# Patient Record
Sex: MALE | Race: WHITE | NOT HISPANIC OR LATINO | Employment: FULL TIME | URBAN - METROPOLITAN AREA
[De-identification: names, ages, dates, MRNs, and addresses within clinical notes are randomized per-mention and may not be internally consistent; named-entity substitution may affect disease eponyms.]

---

## 2023-09-19 ENCOUNTER — OFFICE VISIT (OUTPATIENT)
Dept: FAMILY MEDICINE CLINIC | Facility: CLINIC | Age: 38
End: 2023-09-19
Payer: OTHER GOVERNMENT

## 2023-09-19 VITALS
HEART RATE: 73 BPM | HEIGHT: 74 IN | RESPIRATION RATE: 18 BRPM | BODY MASS INDEX: 31.88 KG/M2 | WEIGHT: 248.4 LBS | SYSTOLIC BLOOD PRESSURE: 138 MMHG | DIASTOLIC BLOOD PRESSURE: 70 MMHG | OXYGEN SATURATION: 97 % | TEMPERATURE: 98 F

## 2023-09-19 DIAGNOSIS — Z11.59 NEED FOR HEPATITIS C SCREENING TEST: ICD-10-CM

## 2023-09-19 DIAGNOSIS — F41.9 ANXIETY: ICD-10-CM

## 2023-09-19 DIAGNOSIS — Z00.00 ENCOUNTER FOR MEDICAL EXAMINATION TO ESTABLISH CARE: ICD-10-CM

## 2023-09-19 DIAGNOSIS — Z12.5 SCREENING PSA (PROSTATE SPECIFIC ANTIGEN): ICD-10-CM

## 2023-09-19 DIAGNOSIS — R03.0 ELEVATED BLOOD PRESSURE READING IN OFFICE WITHOUT DIAGNOSIS OF HYPERTENSION: ICD-10-CM

## 2023-09-19 DIAGNOSIS — R35.0 URINARY FREQUENCY: Primary | ICD-10-CM

## 2023-09-19 DIAGNOSIS — Z01.89 ENCOUNTER FOR ROUTINE CHEST X-RAY: ICD-10-CM

## 2023-09-19 PROBLEM — R80.9 PROTEINURIA: Status: RESOLVED | Noted: 2023-09-19 | Resolved: 2023-09-19

## 2023-09-19 PROBLEM — M54.9 CHRONIC BACK PAIN: Status: ACTIVE | Noted: 2023-09-19

## 2023-09-19 PROBLEM — Z23 NEED FOR VACCINATION: Status: RESOLVED | Noted: 2023-09-19 | Resolved: 2023-09-19

## 2023-09-19 PROBLEM — H31.002 CHORIORETINAL SCAR OF LEFT EYE: Status: RESOLVED | Noted: 2023-09-19 | Resolved: 2023-09-19

## 2023-09-19 PROBLEM — Z02.79 OTHER ISSUE OF MEDICAL CERTIFICATES: Status: RESOLVED | Noted: 2023-09-19 | Resolved: 2023-09-19

## 2023-09-19 PROBLEM — Z02.89 ENCOUNTER FOR OCCUPATIONAL HISTORY AND PHYSICAL EXAMINATION: Status: RESOLVED | Noted: 2023-09-19 | Resolved: 2023-09-19

## 2023-09-19 PROBLEM — Z71.89 OTHER SPECIFIED COUNSELING: Status: RESOLVED | Noted: 2023-09-19 | Resolved: 2023-09-19

## 2023-09-19 PROBLEM — Z71.89 COUNSELING ON SUBSTANCE USE AND ABUSE: Status: RESOLVED | Noted: 2023-09-19 | Resolved: 2023-09-19

## 2023-09-19 PROBLEM — H52.00 HYPEROPIA: Status: RESOLVED | Noted: 2023-09-19 | Resolved: 2023-09-19

## 2023-09-19 PROBLEM — Z02.89 HEALTH EXAMINATION OF DEFINED SUBPOPULATION: Status: RESOLVED | Noted: 2023-09-19 | Resolved: 2023-09-19

## 2023-09-19 PROBLEM — M25.552 PAIN OF LEFT HIP JOINT: Status: ACTIVE | Noted: 2023-09-19

## 2023-09-19 PROBLEM — G89.29 CHRONIC BACK PAIN: Status: ACTIVE | Noted: 2023-09-19

## 2023-09-19 PROBLEM — Z02.4 DRIVER'S PERMIT PHYSICAL EXAMINATION: Status: RESOLVED | Noted: 2023-09-19 | Resolved: 2023-09-19

## 2023-09-19 PROBLEM — Z71.3 ENCOUNTER FOR WEIGHT LOSS COUNSELING: Status: RESOLVED | Noted: 2023-09-19 | Resolved: 2023-09-19

## 2023-09-19 PROBLEM — Z02.9 ENCOUNTERS FOR ADMINISTRATIVE PURPOSE: Status: RESOLVED | Noted: 2023-09-19 | Resolved: 2023-09-19

## 2023-09-19 PROBLEM — Z01.10 OTHER EXAMINATION OF EARS AND HEARING: Status: RESOLVED | Noted: 2023-09-19 | Resolved: 2023-09-19

## 2023-09-19 PROBLEM — H17.9 CORNEAL SCAR: Status: RESOLVED | Noted: 2023-09-19 | Resolved: 2023-09-19

## 2023-09-19 PROBLEM — B02.9 HERPES ZOSTER: Status: RESOLVED | Noted: 2023-09-19 | Resolved: 2023-09-19

## 2023-09-19 PROCEDURE — 81003 URINALYSIS AUTO W/O SCOPE: CPT | Performed by: NURSE PRACTITIONER

## 2023-09-19 PROCEDURE — 84403 ASSAY OF TOTAL TESTOSTERONE: CPT | Performed by: NURSE PRACTITIONER

## 2023-09-19 PROCEDURE — 80053 COMPREHEN METABOLIC PANEL: CPT | Performed by: NURSE PRACTITIONER

## 2023-09-19 PROCEDURE — 85025 COMPLETE CBC W/AUTO DIFF WBC: CPT | Performed by: NURSE PRACTITIONER

## 2023-09-19 PROCEDURE — 84443 ASSAY THYROID STIM HORMONE: CPT | Performed by: NURSE PRACTITIONER

## 2023-09-19 PROCEDURE — 80061 LIPID PANEL: CPT | Performed by: NURSE PRACTITIONER

## 2023-09-19 PROCEDURE — 84439 ASSAY OF FREE THYROXINE: CPT | Performed by: NURSE PRACTITIONER

## 2023-09-19 PROCEDURE — G0103 PSA SCREENING: HCPCS | Performed by: NURSE PRACTITIONER

## 2023-09-19 PROCEDURE — 83036 HEMOGLOBIN GLYCOSYLATED A1C: CPT | Performed by: NURSE PRACTITIONER

## 2023-09-19 PROCEDURE — 86803 HEPATITIS C AB TEST: CPT | Performed by: NURSE PRACTITIONER

## 2023-09-19 RX ORDER — MULTIPLE VITAMINS W/ MINERALS TAB 9MG-400MCG
1 TAB ORAL DAILY
COMMUNITY

## 2023-09-19 NOTE — PROGRESS NOTES
"Chief Complaint  Establish Care and Difficulty Urinating (Onset 6-8 months. Feels like bladder doesn't empty, weaker stream, has to strain to go)    Subjective        Heron Pearson presents to [facility]  History of Present Illness    Patient presents today to establish care. Recently relocated to [facility]; reported serving in [employer] since 8/2004; planning nursing home soon. Currently in Master's program; exploring new future job opportunities.      Urinary symptoms:  Onset of symptoms began 6-8 months ago. Severity of symptoms are mild. Status is worsening. Frequency is intermittent. Presenting/initial symptoms include frequency-yes, hesitancy-yes, nocturia-yes 2-3x per night.  Drinks a lot of water throughout the day. No excess caffeine intake. Aggravated by nothing.  Relieved by nothing. Pertinent negatives include abdominal pain, dribbling, dysuria, fatigue, fever, flank pain,hematuria, nausea, cloudy urine, foul urine odor, pelvic pain, penile discharge, pressure, rash, retention, urgency, vomiting. Comments: Reported history of performance anxiety; still occurs occasionally; took Paxil in the past for short timeframe without help; no erectile problem; no depression; would like testosterone level checked today.         Objective   Vital Signs:  /70 (BP Location: Left arm, Patient Position: Sitting, Cuff Size: Large Adult)   Pulse 73   Temp 98 °F (36.7 °C) (Oral)   Resp 18   Ht 186.7 cm (73.5\")   Wt 113 kg (248 lb 6.4 oz)   SpO2 97% Comment: Room air  BMI 32.33 kg/m²   Estimated body mass index is 32.33 kg/m² as calculated from the following:    Height as of this encounter: 186.7 cm (73.5\").    Weight as of this encounter: 113 kg (248 lb 6.4 oz).             Physical Exam  Vitals and nursing note reviewed.   Constitutional:       Appearance: Normal appearance.   HENT:      Head: Normocephalic and atraumatic.      Right Ear: Tympanic membrane, ear canal and " external ear normal.      Left Ear: Tympanic membrane, ear canal and external ear normal.      Nose: Nose normal.      Mouth/Throat:      Pharynx: Oropharynx is clear.   Eyes:      Conjunctiva/sclera: Conjunctivae normal.   Cardiovascular:      Rate and Rhythm: Normal rate and regular rhythm.      Chest Wall: PMI is not displaced.      Pulses: Normal pulses.      Heart sounds: Normal heart sounds.   Pulmonary:      Effort: Pulmonary effort is normal.      Breath sounds: Normal breath sounds.   Abdominal:      General: Bowel sounds are normal.      Palpations: Abdomen is soft.      Tenderness: There is no abdominal tenderness.   Musculoskeletal:         General: Normal range of motion.      Cervical back: Neck supple.      Right lower leg: No edema.      Left lower leg: No edema.   Lymphadenopathy:      Cervical: No cervical adenopathy.      Right cervical: No superficial cervical adenopathy.     Left cervical: No superficial cervical adenopathy.   Skin:     General: Skin is warm and dry.   Neurological:      Mental Status: He is alert and oriented to person, place, and time.      Gait: Gait is intact.   Psychiatric:         Mood and Affect: Mood normal.         Behavior: Behavior normal.         Thought Content: Thought content normal.         Judgment: Judgment normal.      Result Review :                   Assessment and Plan   Diagnoses and all orders for this visit:    1. Urinary frequency (Primary)  Comments:  1. Labs today.   2. Follow up 8 weeks.  Orders:  -     Urinalysis With Culture If Indicated - Urine, Clean Catch  -     PSA Screen    2. Anxiety  Comments:  1. Lab today.  2. Follow up 8 weeks.  Orders:  -     Testosterone    3. Elevated blood pressure reading in office without diagnosis of hypertension  Comments:  Monitor. Check BP next visit.    4. Encounter for medical examination to establish care  -     Hemoglobin A1c  -     T4, Free  -     Urinalysis With Culture If Indicated - Urine, Clean Catch  -      Lipid Panel  -     Comprehensive Metabolic Panel  -     CBC & Differential  -     TSH    5. Encounter for routine chest x-ray  Comments:  Past smoker-10 years.  Orders:  -     XR Chest 2 View; Future    6. Need for hepatitis C screening test  -     Hepatitis C Antibody    7. Screening PSA (prostate specific antigen)  -     PSA Screen             Follow Up   Return in about 2 months (around 11/19/2023) for follow up urinary frequency/anxiety/BP check .  Patient was given instructions and counseling regarding his condition or for health maintenance advice. Please see specific information pulled into the AVS if appropriate.

## 2023-09-19 NOTE — PROGRESS NOTES
Venipuncture Blood Specimen Collection  Venipuncture performed in RA by Nan Randhawa MA with good hemostasis. Patient tolerated the procedure well without complications.   09/19/23   Nan Randhawa MA

## 2023-09-20 LAB
ALBUMIN SERPL-MCNC: 5.1 G/DL (ref 3.5–5.2)
ALBUMIN/GLOB SERPL: 2.2 G/DL
ALP SERPL-CCNC: 39 U/L (ref 39–117)
ALT SERPL W P-5'-P-CCNC: 33 U/L (ref 1–41)
ANION GAP SERPL CALCULATED.3IONS-SCNC: 8.9 MMOL/L (ref 5–15)
AST SERPL-CCNC: 30 U/L (ref 1–40)
BASOPHILS # BLD AUTO: 0.04 10*3/MM3 (ref 0–0.2)
BASOPHILS NFR BLD AUTO: 0.7 % (ref 0–1.5)
BILIRUB SERPL-MCNC: 0.6 MG/DL (ref 0–1.2)
BILIRUB UR QL STRIP: NEGATIVE
BUN SERPL-MCNC: 17 MG/DL (ref 6–20)
BUN/CREAT SERPL: 16.2 (ref 7–25)
CALCIUM SPEC-SCNC: 10.2 MG/DL (ref 8.6–10.5)
CHLORIDE SERPL-SCNC: 103 MMOL/L (ref 98–107)
CHOLEST SERPL-MCNC: 139 MG/DL (ref 0–200)
CLARITY UR: CLEAR
CO2 SERPL-SCNC: 28.1 MMOL/L (ref 22–29)
COLOR UR: YELLOW
CREAT SERPL-MCNC: 1.05 MG/DL (ref 0.76–1.27)
DEPRECATED RDW RBC AUTO: 40.4 FL (ref 37–54)
EGFRCR SERPLBLD CKD-EPI 2021: 92.6 ML/MIN/1.73
EOSINOPHIL # BLD AUTO: 0.06 10*3/MM3 (ref 0–0.4)
EOSINOPHIL NFR BLD AUTO: 1 % (ref 0.3–6.2)
ERYTHROCYTE [DISTWIDTH] IN BLOOD BY AUTOMATED COUNT: 12.5 % (ref 12.3–15.4)
GLOBULIN UR ELPH-MCNC: 2.3 GM/DL
GLUCOSE SERPL-MCNC: 97 MG/DL (ref 65–99)
GLUCOSE UR STRIP-MCNC: NEGATIVE MG/DL
HBA1C MFR BLD: 5.4 % (ref 4.8–5.6)
HCT VFR BLD AUTO: 45.1 % (ref 37.5–51)
HCV AB SER DONR QL: NORMAL
HDLC SERPL-MCNC: 61 MG/DL (ref 40–60)
HGB BLD-MCNC: 15.6 G/DL (ref 13–17.7)
HGB UR QL STRIP.AUTO: NEGATIVE
HOLD SPECIMEN: NORMAL
IMM GRANULOCYTES # BLD AUTO: 0.02 10*3/MM3 (ref 0–0.05)
IMM GRANULOCYTES NFR BLD AUTO: 0.3 % (ref 0–0.5)
KETONES UR QL STRIP: NEGATIVE
LDLC SERPL CALC-MCNC: 67 MG/DL (ref 0–100)
LDLC/HDLC SERPL: 1.12 {RATIO}
LEUKOCYTE ESTERASE UR QL STRIP.AUTO: NEGATIVE
LYMPHOCYTES # BLD AUTO: 1.57 10*3/MM3 (ref 0.7–3.1)
LYMPHOCYTES NFR BLD AUTO: 26.6 % (ref 19.6–45.3)
MCH RBC QN AUTO: 30.8 PG (ref 26.6–33)
MCHC RBC AUTO-ENTMCNC: 34.6 G/DL (ref 31.5–35.7)
MCV RBC AUTO: 89 FL (ref 79–97)
MONOCYTES # BLD AUTO: 0.66 10*3/MM3 (ref 0.1–0.9)
MONOCYTES NFR BLD AUTO: 11.2 % (ref 5–12)
NEUTROPHILS NFR BLD AUTO: 3.55 10*3/MM3 (ref 1.7–7)
NEUTROPHILS NFR BLD AUTO: 60.2 % (ref 42.7–76)
NITRITE UR QL STRIP: NEGATIVE
NRBC BLD AUTO-RTO: 0 /100 WBC (ref 0–0.2)
PH UR STRIP.AUTO: 6.5 [PH] (ref 5–8)
PLATELET # BLD AUTO: 244 10*3/MM3 (ref 140–450)
PMV BLD AUTO: 9.5 FL (ref 6–12)
POTASSIUM SERPL-SCNC: 4 MMOL/L (ref 3.5–5.2)
PROT SERPL-MCNC: 7.4 G/DL (ref 6–8.5)
PROT UR QL STRIP: NEGATIVE
PSA SERPL-MCNC: 0.89 NG/ML (ref 0–4)
RBC # BLD AUTO: 5.07 10*6/MM3 (ref 4.14–5.8)
SODIUM SERPL-SCNC: 140 MMOL/L (ref 136–145)
SP GR UR STRIP: 1.02 (ref 1–1.03)
T4 FREE SERPL-MCNC: 1.01 NG/DL (ref 0.93–1.7)
TESTOST SERPL-MCNC: 415 NG/DL (ref 249–836)
TRIGL SERPL-MCNC: 49 MG/DL (ref 0–150)
TSH SERPL DL<=0.05 MIU/L-ACNC: 3.53 UIU/ML (ref 0.27–4.2)
UROBILINOGEN UR QL STRIP: NORMAL
VLDLC SERPL-MCNC: 11 MG/DL (ref 5–40)
WBC NRBC COR # BLD: 5.9 10*3/MM3 (ref 3.4–10.8)

## 2023-11-21 ENCOUNTER — OFFICE VISIT (OUTPATIENT)
Dept: FAMILY MEDICINE CLINIC | Facility: CLINIC | Age: 38
End: 2023-11-21
Payer: OTHER GOVERNMENT

## 2023-11-21 VITALS
HEART RATE: 76 BPM | HEIGHT: 74 IN | DIASTOLIC BLOOD PRESSURE: 80 MMHG | WEIGHT: 249.8 LBS | TEMPERATURE: 98.2 F | SYSTOLIC BLOOD PRESSURE: 127 MMHG | BODY MASS INDEX: 32.06 KG/M2 | OXYGEN SATURATION: 97 % | RESPIRATION RATE: 16 BRPM

## 2023-11-21 DIAGNOSIS — F41.9 ANXIETY: ICD-10-CM

## 2023-11-21 DIAGNOSIS — R35.0 URINARY FREQUENCY: Primary | ICD-10-CM

## 2023-11-21 DIAGNOSIS — R03.0 ELEVATED BLOOD PRESSURE READING IN OFFICE WITHOUT DIAGNOSIS OF HYPERTENSION: ICD-10-CM

## 2023-11-21 DIAGNOSIS — M25.512 CHRONIC LEFT SHOULDER PAIN: ICD-10-CM

## 2023-11-21 DIAGNOSIS — G89.29 CHRONIC LEFT SHOULDER PAIN: ICD-10-CM

## 2023-11-21 PROBLEM — L72.3 SEBACEOUS CYST: Status: RESOLVED | Noted: 2023-11-21 | Resolved: 2023-11-21

## 2023-11-21 PROBLEM — F17.200 NICOTINE DEPENDENCE: Status: RESOLVED | Noted: 2023-11-21 | Resolved: 2023-11-21

## 2023-11-21 NOTE — PROGRESS NOTES
"Chief Complaint  Anxiety and Urinary Problems    Subjective        Heron Pearson presents to [facility]  History of Present Illness    Patient presents today for follow up on urinary frequency, anxiety, and elevated blood pressure reading (normal today).  Labs completed September 19, 2023 have been discussed today.  Urinary frequency and performance anxiety is improved 50 %. No longer drinking excessive fluids prior to bedtime.  Pertinent negatives include hesitancy, dribbling, retention, urgency, erectile problem, and lack of libido.    Left shoulder pain:   Onset of pain began 6 months ago. Severity of pain is mild/moderate. Frequency is intermittent. Status is no change. Location is left shoulder; anterior lateral. There is radiation to left bicep.  The quality of the pain is aching. There is no injury.  Context includes current  service; physical fitness training season. Aggravating factors include chin ups/pull ups.  There is associated decreased mobility and joint tenderness.  Pertinent negatives include crepitus, fever, bruising, difficulty initiating sleep, joint instability, locking, nocturnal awakening, numbness/weakness, popping, spasms, swelling, and tingling in arms.         Objective   Vital Signs:  /80 (BP Location: Left arm, Patient Position: Sitting, Cuff Size: Adult)   Pulse 76   Temp 98.2 °F (36.8 °C) (Infrared)   Resp 16   Ht 186.7 cm (73.5\")   Wt 113 kg (249 lb 12.8 oz)   SpO2 97%   BMI 32.51 kg/m²   Estimated body mass index is 32.51 kg/m² as calculated from the following:    Height as of this encounter: 186.7 cm (73.5\").    Weight as of this encounter: 113 kg (249 lb 12.8 oz).             Physical Exam  Constitutional:       General: He is not in acute distress.  Cardiovascular:      Rate and Rhythm: Normal rate and regular rhythm.      Chest Wall: PMI is not displaced.      Heart sounds: Normal heart sounds.   Pulmonary:      Effort: " Pulmonary effort is normal.      Breath sounds: Normal breath sounds and air entry.   Musculoskeletal:      Left shoulder: No swelling, tenderness, bony tenderness or crepitus. Decreased range of motion. Normal strength.      Left upper arm: No swelling or tenderness.      Comments: Left arm with normal adduction; abduction with verbalized left shoulder pain at 120-140 degrees    Skin:     General: Skin is warm and dry.   Neurological:      Mental Status: He is alert and oriented to person, place, and time.      Gait: Gait is intact.   Psychiatric:         Mood and Affect: Mood normal.         Behavior: Behavior normal.         Thought Content: Thought content normal.         Judgment: Judgment normal.        Result Review :    CMP          9/19/2023    09:59   CMP   Glucose 97    BUN 17    Creatinine 1.05    EGFR 92.6    Sodium 140    Potassium 4.0    Chloride 103    Calcium 10.2    Total Protein 7.4    Albumin 5.1    Globulin 2.3    Total Bilirubin 0.6    Alkaline Phosphatase 39    AST (SGOT) 30    ALT (SGPT) 33    Albumin/Globulin Ratio 2.2    BUN/Creatinine Ratio 16.2    Anion Gap 8.9      CBC w/diff          9/19/2023    09:59   CBC w/Diff   WBC 5.90    RBC 5.07    Hemoglobin 15.6    Hematocrit 45.1    MCV 89.0    MCH 30.8    MCHC 34.6    RDW 12.5    Platelets 244    Neutrophil Rel % 60.2    Immature Granulocyte Rel % 0.3    Lymphocyte Rel % 26.6    Monocyte Rel % 11.2    Eosinophil Rel % 1.0    Basophil Rel % 0.7      Lipid Panel          9/19/2023    09:59   Lipid Panel   Total Cholesterol 139    Triglycerides 49    HDL Cholesterol 61    VLDL Cholesterol 11    LDL Cholesterol  67    LDL/HDL Ratio 1.12      TSH          9/19/2023    09:59   TSH   TSH 3.530      Most Recent A1C          9/19/2023    09:59   HGBA1C Most Recent   Hemoglobin A1C 5.40      UA          9/19/2023    09:59   Urinalysis   Specific Gravity, UA 1.022    Ketones, UA Negative    Blood, UA Negative    Leukocytes, UA Negative    Nitrite, UA  Negative      PSA          9/19/2023    09:59   PSA   PSA 0.891        Testosterone, Total  249.00 - 836.00 ng/dL 415.00     PSA  0.000 - 4.000 ng/mL 0.891            Assessment and Plan   Diagnoses and all orders for this visit:    1. Urinary frequency (Primary)  Comments:  Symptoms improving.  UA and PSA normal.  Return if persists or worsens    2. Anxiety  Comments:  Discussed propranolol as needed.  Will consider.    3. Elevated blood pressure reading in office without diagnosis of hypertension  Comments:  Normal blood pressure today.  Continue to monitor.    4. Chronic left shoulder pain  -     Ambulatory Referral to Physical Therapy Evaluate and treat (left shoulder pain)           I spent 30 minutes caring for Heron on this date of service. This time includes time spent by me in the following activities:preparing for the visit, reviewing tests, performing a medically appropriate examination and/or evaluation , counseling and educating the patient/family/caregiver, referring and communicating with other health care professionals , and documenting information in the medical record  Follow Up   Return if symptoms worsen or fail to improve.  Patient was given instructions and counseling regarding his condition or for health maintenance advice. Please see specific information pulled into the AVS if appropriate.

## 2023-11-27 ENCOUNTER — TREATMENT (OUTPATIENT)
Dept: PHYSICAL THERAPY | Facility: CLINIC | Age: 38
End: 2023-11-27
Payer: OTHER GOVERNMENT

## 2023-11-27 DIAGNOSIS — M25.512 CHRONIC LEFT SHOULDER PAIN: Primary | ICD-10-CM

## 2023-11-27 DIAGNOSIS — G89.29 CHRONIC LEFT SHOULDER PAIN: Primary | ICD-10-CM

## 2023-11-27 DIAGNOSIS — M25.312 ROTATOR CUFF INSUFFICIENCY, LEFT: ICD-10-CM

## 2023-11-27 PROCEDURE — 97530 THERAPEUTIC ACTIVITIES: CPT | Performed by: PHYSICAL THERAPIST

## 2023-11-27 PROCEDURE — 97161 PT EVAL LOW COMPLEX 20 MIN: CPT | Performed by: PHYSICAL THERAPIST

## 2023-11-27 PROCEDURE — 97110 THERAPEUTIC EXERCISES: CPT | Performed by: PHYSICAL THERAPIST

## 2023-11-27 NOTE — PROGRESS NOTES
Physical Therapy Initial Evaluation and Plan of Care   [facility] [place]  [address], [address]  [place] [address]    Patient: Heron Pearson   : 1985  Diagnosis/ICD-10 Code:  Chronic left shoulder pain [M25.512, G89.29]  Referring practitioner: GERDA Mcmahan  Date of Initial Visit: 2023  Today's Date: 2023  Patient seen for 1 sessions        ICD-10-CM ICD-9-CM   1. Chronic left shoulder pain  M25.512 719.41    G89.29 338.29   2. Rotator cuff insufficiency, left  M25.312 726.10     Past Medical History:   Diagnosis Date    Body mass index 28.0-28.9, adult 2023    Chorioretinal scar of left eye 2023    Corneal scar 2023    Counseling on substance use and abuse 2023    Smoking Cessation    Counseling on substance use and abuse 2023    's permit physical examination 2023    Encounter for occupational history and physical examination 2023    F/U SCHEDULED FOR CYST REMOVAL.  PQ FOR DUTY.    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   He reports the pain first started in February of this year of insidious onset and has gotten progressively worse over time.    Pain  Current pain rating: 3  At best pain rating: 3  At worst pain ratin  Location: front and back of shoulder  Relieving factors: rest (motrin)  Exacerbated by: reaching up, out and behind the back, lying on it wrong, sleeping on it wrong.  Progression: worsening    Social Support  Lives in: one-story house  Lives with: spouse    Hand dominance: right    Treatments  Current treatment: physical therapy  Patient Goals  Patient goals for therapy: decreased pain, increased motion, independence with ADLs/IADLs and return to sport/leisure activities  Patient goal: return to workouts without pain         Objective          Postural Observations  Seated posture: good  Standing posture: good      Cervical/Thoracic Screen   Cervical range of motion within normal limits  Thoracic range of motion within normal limits    Active Range of Motion   Left Shoulder   Flexion: WFL and with pain  Abduction: WFL and with pain  External rotation 90°: 70 degrees with pain  Internal rotation 90°: WFL    Right Shoulder   Normal active range of motion    Scapular Mobility   Left Shoulder   Scapular mobility: WFL    Strength/Myotome Testing     Left Shoulder     Planes of Motion   Flexion: 5   Extension: 5   Abduction: 5   Adduction: 5   External rotation at 45°: 4   Internal rotation at 0°: 5     Isolated Muscles   Subscapularis: 5   Supraspinatus: 4+     Tests     Left Shoulder   Positive painful arc.   Negative belly press, empty can, full can, lift-off, Speed's and Yergason's.         Access Code: [email or web address]  URL: [email or web address]  Date: 2023  Prepared by: Theo Vazquez    Exercises  - Seated Shoulder Inferior Glide  - 1 x daily - 7 x weekly - 1 sets - 3 reps - 20 sec hold  - Standing Shoulder Posterior Capsule Stretch  - 1 x daily - 7 x weekly - 1 sets - 3 reps - 20 sec  hold  - Shoulder External Rotation with Anchored Resistance  - 1 x daily - 7 x weekly - 2 sets - 20 reps  - Shoulder Internal Rotation with Resistance  - 1 x daily - 7 x weekly - 2 sets - 20 reps      Assessment & Plan       Assessment  Impairments: abnormal or restricted ROM, lacks appropriate home exercise program and pain with function   Functional limitations: lifting, pulling, pushing, uncomfortable because of pain and reaching overhead   Assessment details: 38 year old male who presents with the impairments noted above secondary to left shoulder pain with overhead motion, painful arc and decreased strength of shoulder external rotators..  These impairments decrease his ability and tolerance to perform his normal daily activities.  This patient presents with a level of complexity and his condition is such that the services required can be safely and effectively performed only by a qualified PT or supervised PTA.     Prognosis: good    Goals  Plan Goals: STG (6 weeks)  1) Independent in home program for basic shoulder range of motion stretching  2) Demonstrates basic understanding of condition and their role in treatment progression  3) Tolerates initiation of shoulder strengthening  4) Demonstrates slight improvement in tolerance to daily activities as evidenced by QuickDash score of 10% or less.      LTG (12 Weeks)  1) Independent in home program for self-management of his condition  2) Demonstrates AROM  L shoulder flexion and abduction equal to 170 degrees or greater to allow for use of that upper extremity for reaching at or above head-height without pain  3) Demonstrates AROM L shoulder internal and external rotation at 90 degrees abduction equal to 80 degrees or greater to allow for use of that upper extremity for dressing and grooming  4) Demonstrates significant improvement in his tolerance to daily activities as evidenced by QuickDash score of 5% or less.  5) MMT of L shoulder flexion/abduction/IR/ER  equal to 5/5  to allow for ability to safely use that upper extremity for lifting, carrying and overhead work  6) Reports ability to return to working out without difficulty or significant shoulder pain.      Plan  Planned modality interventions: cryotherapy, ultrasound, thermotherapy (hydrocollator packs), TENS, high voltage pulsed current (spasm management), high voltage pulsed current (pain management) and microcurrent electrical stimulation  Planned therapy interventions: flexibility, functional ROM exercises, home exercise program, IADL retraining, joint mobilization, stretching, strengthening, therapeutic activities, soft tissue mobilization, neuromuscular re-education and manual therapy  Frequency: 2x week  Duration in weeks: 12  Treatment plan discussed with: patient        History # of Personal Factors and/or Comorbidities: LOW (0)  Examination of Body System(s): # of elements: LOW (1-2)  Clinical Presentation: STABLE   Clinical Decision Making: LOW     Timed:         Manual Therapy:         mins  10060;     Therapeutic Exercise:    15     mins  57921;     Neuromuscular Lady:        mins  36132;    Therapeutic Activity:     10     mins  45797;     Gait Training:           mins  03055;     Ultrasound:          mins  66999;    Ionto                                   mins   48375  Self Care                            mins   48878      Un-Timed:  Electrical Stimulation:         mins  72300 ( );  Canalith Repos         mins 02362  Traction          mins 85875  Dry Needle                 ______ mins DRYNDL  Low Eval     30     Mins  76822  Mod Eval          Mins  14444  High Eval                            Mins  63883  Re-Eval                               mins  71988        Timed Treatment:   25   mins   Total Treatment:     55  mins    PT SIGNATURE: Kwasi Vazquez, ZEUS   DATE TREATMENT INITIATED: 11/27/2023    Initial Certification  Certification Period: 11/27/2023 through 2/25/2024  I certify that the  therapy services are furnished while this patient is under my care.  The services outlined above are required by this patient, and will be reviewed every 90 days.     PHYSICIAN: Skyla Vega, APRN      DATE:     Please sign and return via fax to [phone]. Thank you, [facility].

## 2023-12-04 ENCOUNTER — TREATMENT (OUTPATIENT)
Dept: PHYSICAL THERAPY | Facility: CLINIC | Age: 38
End: 2023-12-04
Payer: OTHER GOVERNMENT

## 2023-12-04 DIAGNOSIS — G89.29 CHRONIC LEFT SHOULDER PAIN: Primary | ICD-10-CM

## 2023-12-04 DIAGNOSIS — M25.512 CHRONIC LEFT SHOULDER PAIN: Primary | ICD-10-CM

## 2023-12-04 DIAGNOSIS — M25.312 ROTATOR CUFF INSUFFICIENCY, LEFT: ICD-10-CM

## 2023-12-04 PROCEDURE — 97112 NEUROMUSCULAR REEDUCATION: CPT | Performed by: PHYSICAL THERAPIST

## 2023-12-04 PROCEDURE — 97110 THERAPEUTIC EXERCISES: CPT | Performed by: PHYSICAL THERAPIST

## 2023-12-04 PROCEDURE — 97530 THERAPEUTIC ACTIVITIES: CPT | Performed by: PHYSICAL THERAPIST

## 2023-12-04 NOTE — PROGRESS NOTES
Physical Therapy Treatment Note  [facility] [place]  [address]  [place] [address]    Patient: Heron Pearson   : 1985  Referring practitioner: GERDA Mcmahan  Date of Initial Visit: Type: THERAPY  Noted: 2023  Today's Date: 2023  Patient seen for 2 sessions    Diagnosis/ICD-10 Codes:       ICD-10-CM ICD-9-CM   1. Chronic left shoulder pain  M25.512 719.41    G89.29 338.29   2. Rotator cuff insufficiency, left  M25.312 726.10            Subjective     Heron Pearson reports: Doing well with home exercises from evaluation.  No significant changes in shoulder symptoms as yet.      Objective   See Exercise, Manual, and Modality Logs for complete treatment.     Issued, instructed in & performed home exercise program below:   Access Code: [email or web address]  URL: [email or web address]  Date: 2023  Prepared by: Theo Vazquez    Exercises  - Seated Shoulder Inferior Glide  - 1 x daily - 7 x weekly - 1 sets - 3 reps - 20 sec hold  - Standing Shoulder Posterior Capsule Stretch  - 1 x daily - 7 x weekly - 1 sets - 3 reps - 20 sec hold  - Sleeper Stretch  - 1 x daily - 7 x weekly - 1 sets - 10 reps - 10 sec hold  - Shoulder External Rotation with Anchored Resistance  - 1 x daily - 7 x weekly - 2 sets - 20 reps  - Shoulder Internal Rotation with Resistance  - 1 x daily - 7 x weekly - 2 sets - 20 reps  - Shoulder Flexion Wall Slide with Towel  - 1 x daily - 7 x weekly - 1 sets - 10 reps  - Shoulder Scaption Wall Slide with Towel  - 1 x daily - 7 x weekly - 1 sets - 10 reps  - Sidelying Shoulder ER with Towel and Dumbbell  - 1 x daily - 7 x weekly - 2 sets - 10 reps  - Prone Shoulder Extension on Swiss Ball  - 1 x daily - 7 x weekly - 2 sets - 10 reps  - Prone Shoulder Horizontal Abduction on Swiss Ball  - 1 x daily - 7 x weekly - 2 sets - 10 reps  - Prone Lower Trapezius Strengthening on Swiss Ball  - 1 x daily - 7 x weekly - 2 sets - 10 reps    Progressed / advanced exercises as  noted in flow sheets; advanced home program      Assessment/Plan  Good tolerance to interventions in clinic and doing well with home program thus far.    Progress strengthening /stabilization /functional activity as tolerated.  Assess response to to home exercise program. Assess response to today's interventions        Manual Therapy:         mins  98243;  Therapeutic Exercise:    20     mins  20869;     Neuromuscular Lady:    10    mins  57163;  Di  Therapeutic Activity:     10     mins  89427;     Gait Training:           mins  91539;     Ultrasound:          mins  98301;    Electrical Stimulation:         mins  30833 ( );  Dry Needling          mins self-pay    Timed Treatment:   40   mins   Total Treatment:     40   mins    Kwasi Vazquez PT  Physical Therapist

## 2023-12-12 ENCOUNTER — TREATMENT (OUTPATIENT)
Dept: PHYSICAL THERAPY | Facility: CLINIC | Age: 38
End: 2023-12-12
Payer: OTHER GOVERNMENT

## 2023-12-12 ENCOUNTER — OFFICE VISIT (OUTPATIENT)
Dept: FAMILY MEDICINE CLINIC | Facility: CLINIC | Age: 38
End: 2023-12-12
Payer: OTHER GOVERNMENT

## 2023-12-12 VITALS
WEIGHT: 249.6 LBS | HEIGHT: 74 IN | SYSTOLIC BLOOD PRESSURE: 138 MMHG | RESPIRATION RATE: 19 BRPM | BODY MASS INDEX: 32.03 KG/M2 | DIASTOLIC BLOOD PRESSURE: 82 MMHG | TEMPERATURE: 97.4 F | OXYGEN SATURATION: 98 % | HEART RATE: 74 BPM

## 2023-12-12 DIAGNOSIS — F41.9 ANXIETY: ICD-10-CM

## 2023-12-12 DIAGNOSIS — M25.512 CHRONIC LEFT SHOULDER PAIN: Primary | ICD-10-CM

## 2023-12-12 DIAGNOSIS — G89.29 CHRONIC LEFT SHOULDER PAIN: Primary | ICD-10-CM

## 2023-12-12 DIAGNOSIS — R03.0 ELEVATED BLOOD PRESSURE READING IN OFFICE WITHOUT DIAGNOSIS OF HYPERTENSION: Primary | ICD-10-CM

## 2023-12-12 DIAGNOSIS — M25.512 CHRONIC LEFT SHOULDER PAIN: ICD-10-CM

## 2023-12-12 DIAGNOSIS — M25.312 ROTATOR CUFF INSUFFICIENCY, LEFT: ICD-10-CM

## 2023-12-12 DIAGNOSIS — G89.29 CHRONIC LEFT SHOULDER PAIN: ICD-10-CM

## 2023-12-12 DIAGNOSIS — M67.922 DISORDER OF TENDON OF LEFT BICEPS: ICD-10-CM

## 2023-12-12 DIAGNOSIS — M25.561 ANTERIOR KNEE PAIN, RIGHT: ICD-10-CM

## 2023-12-12 PROCEDURE — 99214 OFFICE O/P EST MOD 30 MIN: CPT | Performed by: NURSE PRACTITIONER

## 2023-12-12 PROCEDURE — 97530 THERAPEUTIC ACTIVITIES: CPT | Performed by: PHYSICAL THERAPIST

## 2023-12-12 PROCEDURE — 97110 THERAPEUTIC EXERCISES: CPT | Performed by: PHYSICAL THERAPIST

## 2023-12-12 PROCEDURE — 97112 NEUROMUSCULAR REEDUCATION: CPT | Performed by: PHYSICAL THERAPIST

## 2023-12-12 RX ORDER — PROPRANOLOL HYDROCHLORIDE 10 MG/1
10 TABLET ORAL 3 TIMES DAILY
Qty: 90 TABLET | Refills: 0 | Status: SHIPPED | OUTPATIENT
Start: 2023-12-12

## 2023-12-12 NOTE — ASSESSMENT & PLAN NOTE
Discussed monitoring home blood pressure in left arm. Discussed BP goal < 130/80.  Follow-up in 3 months.

## 2023-12-12 NOTE — PROGRESS NOTES
"Chief Complaint  Elevated Blood Pressure and Anxiety    Subjective        Heron Pearson presents to [facility]  History of Present Illness    Patient presents today for follow-up on elevated blood pressure, follow-up anxiety, follow up left shoulder pain.      Elevated blood pressure: Status is fluctuating.  Blood pressure rechecked today; 138/82 manual cuff left arm.  Last visit /80.  There is associated intermittent anxiety.  Reported excessive caffeine intake; 2 cups of coffee, preworkout, and 2+ energy drinks per day. Negative for headache, chest pain, palpitations, dizziness, nausea and vomiting.    Anxiety: Reported anxiety is unchanged since last visit; occurs intermittently and is persistent.  No depression.      Left shoulder pain: Status is stable.  Currently receiving physical therapy services 1-2x/week with home exercises. Dx left rotator cuff insufficiency.  There is concern with associated ongoing pain with left bicep.        Right knee pain:  Onset of pain began July 2023. Severity of pain is mild. Frequency is intermittent. Status is no change. Location is anterior knee. There is no radiation. The quality of the pain is aching.  There is no injury. Aggravating factors include bending, climbing stairs, descending stairs. Relieving factors include ibuprofen. Pertinent negatives include crepitus, decreased mobility, joint tenderness, fever, bruising, difficulty initiating sleep, joint instability, limping, locking, nocturnal awakening, numbness/weakness, popping, spasms, swelling, and tingling in legs.       Objective   Vital Signs:  /82 (BP Location: Left arm, Patient Position: Sitting, Cuff Size: Adult)   Pulse 74   Temp 97.4 °F (36.3 °C) (Oral)   Resp 19   Ht 186.7 cm (73.5\")   Wt 113 kg (249 lb 9.6 oz)   SpO2 98%   BMI 32.48 kg/m²   Estimated body mass index is 32.48 kg/m² as calculated from the following:    Height as of this encounter: 186.7 cm " "(73.5\").    Weight as of this encounter: 113 kg (249 lb 9.6 oz).          Physical Exam  Constitutional:       General: He is not in acute distress.     Appearance: Normal appearance.   Cardiovascular:      Rate and Rhythm: Normal rate and regular rhythm.      Chest Wall: PMI is not displaced.      Heart sounds: Normal heart sounds.   Pulmonary:      Effort: Pulmonary effort is normal.      Breath sounds: Normal breath sounds and air entry.   Musculoskeletal:      Left shoulder: Decreased range of motion.      Left upper arm: No swelling.      Right knee: Normal. No swelling. Normal range of motion. Normal alignment.      Right lower leg: No edema.      Left lower leg: No edema.      Comments: Positive tenderness left bicep.     Skin:     General: Skin is warm and dry.   Neurological:      Mental Status: He is alert and oriented to person, place, and time.      Gait: Gait is intact.   Psychiatric:         Attention and Perception: Attention normal.         Mood and Affect: Mood normal.         Speech: Speech normal.         Behavior: Behavior normal.         Thought Content: Thought content normal.         Judgment: Judgment normal.        Result Review :                   Assessment and Plan   Diagnoses and all orders for this visit:    1. Elevated blood pressure reading in office without diagnosis of hypertension (Primary)  Assessment & Plan:  Discussed monitoring home blood pressure in left arm. Discussed BP goal < 130/80.  Follow-up in 3 months.      2. Chronic left shoulder pain  Assessment & Plan:  Continue PT.  Referral to orthopedics.     Orders:  -     Ambulatory Referral to Orthopedic Surgery    3. Disorder of tendon of left biceps  -     Ambulatory Referral to Orthopedic Surgery    4. Anxiety  Assessment & Plan:  Start propranolol TID as needed.   Follow up 3 months.     Orders:  -     propranolol (INDERAL) 10 MG tablet; Take 1 tablet by mouth 3 (Three) Times a Day. Update provider  Dispense: 90 tablet; " Refill: 0    5. Anterior knee pain, right  Comments:  Monitor. Return if symptoms persist or worsen.             Follow Up   Return in 3 months (on 3/12/2024) for follow up blood pressure/anxiety.  Patient was given instructions and counseling regarding his condition or for health maintenance advice. Please see specific information pulled into the AVS if appropriate.

## 2023-12-12 NOTE — PROGRESS NOTES
TPhysical Therapy Treatment Note  [facility] [place] [facility] [place]  [address], [address]  [place] [address]    Patient: Heron Pearson   : 1985  Referring practitioner: GERDA Mcmahan  Date of Initial Visit: Type: THERAPY  Noted: 2023  Today's Date: 2023  Patient seen for 3 sessions    Diagnosis/ICD-10 Codes:      ICD-10-CM ICD-9-CM   1. Chronic left shoulder pain  M25.512 719.41    G89.29 338.29   2. Rotator cuff insufficiency, left  M25.312 726.10              Subjective     Heron Pearson reports: Tolerating home program well but still having L shoulder pain.      Objective   See Exercise, Manual, and Modality Logs for complete treatment.     Progressed / advanced exercises as noted in flow sheets; included more closed kinetic chain work and RC strengthening and stabilization.    Reviewed & performed home exercise program below:   Access Code: [email or web address]  URL: [email or web address]  Date: 2023  Prepared by: Theo Vazquez    Exercises  - Seated Shoulder Inferior Glide  - 1 x daily - 7 x weekly - 1 sets - 3 reps - 20 sec hold  - Standing Shoulder Posterior Capsule Stretch  - 1 x daily - 7 x weekly - 1 sets - 3 reps - 20 sec hold  - Sleeper Stretch  - 1 x daily - 7 x weekly - 1 sets - 10 reps - 10 sec hold  - Shoulder External Rotation with Anchored Resistance  - 1 x daily - 7 x weekly - 2 sets - 20 reps  - Shoulder Internal Rotation with Resistance  - 1 x daily - 7 x weekly - 2 sets - 20 reps  - Shoulder Flexion Wall Slide with Towel  - 1 x daily - 7 x weekly - 1 sets - 10 reps  - Shoulder Scaption Wall Slide with Towel  - 1 x daily - 7 x weekly - 1 sets - 10 reps  - Sidelying Shoulder ER with Towel and Dumbbell  - 1 x daily - 7 x weekly - 2 sets - 10 reps  - Prone Shoulder Extension on Swiss Ball  - 1 x daily - 7 x weekly - 2 sets - 10 reps  - Prone Shoulder Horizontal Abduction on Swiss Ball  - 1 x daily - 7 x weekly - 2 sets - 10 reps  - Prone Lower Trapezius Strengthening on  Swiss Ball  - 1 x daily - 7 x weekly - 2 sets - 10 reps    Assessment/Plan  Doing well with tolerance to interventions thought no significant changes in pain levels as yet.    Progress strengthening /stabilization /functional activity as tolerated.  Assess response to today's interventions            Manual Therapy:         mins  20567;  Therapeutic Exercise:    20     mins  03718;     Neuromuscular Lady:    10    mins  22064;    Therapeutic Activity:     10     mins  89626;     Gait Training:           mins  68524;     Ultrasound:          mins  18137;    Electrical Stimulation:         mins  11448 ( );  Dry Needling          mins self-pay    Timed Treatment:   40   mins   Total Treatment:     40   mins    Kwasi Vazquez PT  Physical Therapist

## 2023-12-15 ENCOUNTER — TREATMENT (OUTPATIENT)
Dept: PHYSICAL THERAPY | Facility: CLINIC | Age: 38
End: 2023-12-15
Payer: OTHER GOVERNMENT

## 2023-12-15 DIAGNOSIS — M25.312 ROTATOR CUFF INSUFFICIENCY, LEFT: ICD-10-CM

## 2023-12-15 DIAGNOSIS — G89.29 CHRONIC LEFT SHOULDER PAIN: Primary | ICD-10-CM

## 2023-12-15 DIAGNOSIS — M25.512 CHRONIC LEFT SHOULDER PAIN: Primary | ICD-10-CM

## 2023-12-15 PROCEDURE — 97110 THERAPEUTIC EXERCISES: CPT | Performed by: PHYSICAL THERAPIST

## 2023-12-15 PROCEDURE — 97112 NEUROMUSCULAR REEDUCATION: CPT | Performed by: PHYSICAL THERAPIST

## 2023-12-15 PROCEDURE — 97530 THERAPEUTIC ACTIVITIES: CPT | Performed by: PHYSICAL THERAPIST

## 2023-12-18 ENCOUNTER — TREATMENT (OUTPATIENT)
Dept: PHYSICAL THERAPY | Facility: CLINIC | Age: 38
End: 2023-12-18
Payer: OTHER GOVERNMENT

## 2023-12-18 DIAGNOSIS — G89.29 CHRONIC LEFT SHOULDER PAIN: Primary | ICD-10-CM

## 2023-12-18 DIAGNOSIS — M25.512 CHRONIC LEFT SHOULDER PAIN: Primary | ICD-10-CM

## 2023-12-18 DIAGNOSIS — M25.312 ROTATOR CUFF INSUFFICIENCY, LEFT: ICD-10-CM

## 2023-12-18 PROCEDURE — 97530 THERAPEUTIC ACTIVITIES: CPT | Performed by: PHYSICAL THERAPIST

## 2023-12-18 PROCEDURE — 97110 THERAPEUTIC EXERCISES: CPT | Performed by: PHYSICAL THERAPIST

## 2023-12-18 PROCEDURE — 97112 NEUROMUSCULAR REEDUCATION: CPT | Performed by: PHYSICAL THERAPIST

## 2023-12-18 NOTE — PROGRESS NOTES
TPhysical Therapy Treatment Note  [facility] [place]  [address], [address]  [place] [address]    Patient: Heron Pearson   : 1985  Referring practitioner: GERDA Mcmahan  Date of Initial Visit: Type: THERAPY  Noted: 2023  Today's Date: 2023  Patient seen for 5 sessions    Diagnosis/ICD-10 Codes:      ICD-10-CM ICD-9-CM   1. Chronic left shoulder pain  M25.512 719.41    G89.29 338.29   2. Rotator cuff insufficiency, left  M25.312 726.10              Subjective     Heron Pearson reports: Tolerating home program and progression of interventions in clinic well, but still having shoulder pain.      Objective   See Exercise, Manual, and Modality Logs for complete treatment.     Progressed / advanced exercises as noted in flow sheets; included more closed kinetic chain work and RC strengthening and stabilization.    Issued, instructed in & performed home exercise program below:   Access Code: [email or web address]  URL: [email or web address]  Date: 2023  Prepared by: Theo Vazquez    Exercises  - Seated Shoulder Inferior Glide  - 1 x daily - 7 x weekly - 1 sets - 3 reps - 20 sec hold  - Standing Shoulder Posterior Capsule Stretch  - 1 x daily - 7 x weekly - 1 sets - 3 reps - 20 sec hold  - Sleeper Stretch  - 1 x daily - 7 x weekly - 1 sets - 10 reps - 10 sec hold  - Shoulder External Rotation with Anchored Resistance  - 1 x daily - 7 x weekly - 2 sets - 20 reps  - Shoulder Internal Rotation with Resistance  - 1 x daily - 7 x weekly - 2 sets - 20 reps  - Shoulder Flexion Wall Slide with Towel  - 1 x daily - 7 x weekly - 1 sets - 10 reps  - Shoulder Scaption Wall Slide with Towel  - 1 x daily - 7 x weekly - 1 sets - 10 reps  - Sidelying Shoulder ER with Towel and Dumbbell  - 1 x daily - 7 x weekly - 2 sets - 10 reps  - Prone Shoulder Extension on Swiss Ball  - 1 x daily - 7 x weekly - 2 sets - 10 reps  - Prone Shoulder Horizontal Abduction on Swiss Ball  - 1 x daily - 7 x weekly - 2 sets  - 10 reps  - Prone Lower Trapezius Strengthening on Swiss Ball  - 1 x daily - 7 x weekly - 2 sets - 10 reps  - Standing Single Arm Shoulder PNF D1 Flexion with Anchored Resistance  - 1 x daily - 7 x weekly - 2 sets - 10 reps  - Shoulder PNF D2 with Resistance  - 1 x daily - 7 x weekly - 2 sets - 10 reps    Assessment/Plan  Doing well with tolerance to interventions though no significant changes in pain levels as yet.    Progress strengthening /stabilization /functional activity as tolerated.  Assess response to today's interventions and advancement of home program.            Manual Therapy:         mins  89589;  Therapeutic Exercise:    20     mins  28782;     Neuromuscular Lady:    10    mins  40779;    Therapeutic Activity:     10     mins  46873;     Gait Training:           mins  19899;     Ultrasound:          mins  52130;    Electrical Stimulation:         mins  21592 ( );  Dry Needling          mins self-pay    Timed Treatment:   40   mins   Total Treatment:     40   mins    Kwasi Vazquez PT  Physical Therapist

## 2023-12-22 ENCOUNTER — TREATMENT (OUTPATIENT)
Dept: PHYSICAL THERAPY | Facility: CLINIC | Age: 38
End: 2023-12-22
Payer: OTHER GOVERNMENT

## 2023-12-22 DIAGNOSIS — M25.512 CHRONIC LEFT SHOULDER PAIN: Primary | ICD-10-CM

## 2023-12-22 DIAGNOSIS — M25.312 ROTATOR CUFF INSUFFICIENCY, LEFT: ICD-10-CM

## 2023-12-22 DIAGNOSIS — G89.29 CHRONIC LEFT SHOULDER PAIN: Primary | ICD-10-CM

## 2023-12-22 PROCEDURE — 97530 THERAPEUTIC ACTIVITIES: CPT | Performed by: PHYSICAL THERAPIST

## 2023-12-22 PROCEDURE — 97110 THERAPEUTIC EXERCISES: CPT | Performed by: PHYSICAL THERAPIST

## 2023-12-22 PROCEDURE — 97112 NEUROMUSCULAR REEDUCATION: CPT | Performed by: PHYSICAL THERAPIST

## 2023-12-22 NOTE — PROGRESS NOTES
TPhysical Therapy Treatment Note  [facility] [place]  [address], [address]  [place] [address]    Patient: Heron Pearson   : 1985  Referring practitioner: GERDA Mcmahan  Date of Initial Visit: Type: THERAPY  Noted: 2023  Today's Date: 2023  Patient seen for 6 sessions    Diagnosis/ICD-10 Codes:      ICD-10-CM ICD-9-CM   1. Chronic left shoulder pain  M25.512 719.41    G89.29 338.29   2. Rotator cuff insufficiency, left  M25.312 726.10              Subjective     Heron Pearson reports: Still having shoulder pain.   Noted that D1 flexion test caused increased pain.      Objective   See Exercise, Manual, and Modality Logs for complete treatment.     Progressed / advanced exercises as noted in flow sheets; included more closed kinetic chain work and RC strengthening and stabilization.    Reviewed & performed home exercise program below:   Access Code: [email or web address]  URL: [email or web address]  Date: 2023  Prepared by: Theo Vazquez    Exercises  - Seated Shoulder Inferior Glide  - 1 x daily - 7 x weekly - 1 sets - 3 reps - 20 sec hold  - Standing Shoulder Posterior Capsule Stretch  - 1 x daily - 7 x weekly - 1 sets - 3 reps - 20 sec hold  - Sleeper Stretch  - 1 x daily - 7 x weekly - 1 sets - 10 reps - 10 sec hold  - Shoulder External Rotation with Anchored Resistance  - 1 x daily - 7 x weekly - 2 sets - 20 reps  - Shoulder Internal Rotation with Resistance  - 1 x daily - 7 x weekly - 2 sets - 20 reps  - Shoulder Flexion Wall Slide with Towel  - 1 x daily - 7 x weekly - 1 sets - 10 reps  - Shoulder Scaption Wall Slide with Towel  - 1 x daily - 7 x weekly - 1 sets - 10 reps  - Sidelying Shoulder ER with Towel and Dumbbell  - 1 x daily - 7 x weekly - 2 sets - 10 reps  - Prone Shoulder Extension on Swiss Ball  - 1 x daily - 7 x weekly - 2 sets - 10 reps  - Prone Shoulder Horizontal Abduction on Swiss Ball  - 1 x daily - 7 x weekly - 2 sets - 10 reps  - Prone Lower Trapezius  Strengthening on Swiss Ball  - 1 x daily - 7 x weekly - 2 sets - 10 reps  - Standing Single Arm Shoulder PNF D1 Flexion with Anchored Resistance  - 1 x daily - 7 x weekly - 2 sets - 10 reps  - Shoulder PNF D2 with Resistance  - 1 x daily - 7 x weekly - 2 sets - 10 reps    Re-examined shoulder and noted to have pain with AC shear test.  Patient instructed to hold:  - post capsule stretch (cross body)  - d1 flexion resisted      Assessment/Plan  Doing well with tolerance to interventions thought no significant changes in pain levels as yet.  Pain with D1 flexion and cross-body stretch, may have AC joint involvement.  Appt scheduled with ortho on 1/4/2023.    Progress strengthening /stabilization /functional activity as tolerated.  Assess response to today's interventions            Manual Therapy:         mins  93316;  Therapeutic Exercise:    20     mins  83587;     Neuromuscular Lady:    10    mins  35407;    Therapeutic Activity:     10     mins  50641;     Gait Training:           mins  43789;     Ultrasound:          mins  90503;    Electrical Stimulation:         mins  50517 ( );  Dry Needling          mins self-pay    Timed Treatment:   40   mins   Total Treatment:     40   mins    Kwasi Vazquez PT  Physical Therapist

## 2023-12-26 NOTE — PROGRESS NOTES
TPhysical Therapy Treatment Note  [facility] [place]  [address], [address]  [place] [address]    Patient: Heron Pearson   : 1985  Referring practitioner: GERDA Mcmahan  Date of Initial Visit: Type: THERAPY  Noted: 2023  Today's Date: 2023  Patient seen for 4 sessions    Diagnosis/ICD-10 Codes:      ICD-10-CM ICD-9-CM   1. Chronic left shoulder pain  M25.512 719.41    G89.29 338.29   2. Rotator cuff insufficiency, left  M25.312 726.10              Subjective     Heron Pearson reports: Tolerating home program well but still having L shoulder pain.  No much of appreciable change      Objective   See Exercise, Manual, and Modality Logs for complete treatment.     Progressed / advanced exercises as noted in flow sheets; included more closed kinetic chain work and RC strengthening and stabilization.    Reviewed & performed home exercise program below:   Access Code: [email or web address]  URL: [email or web address]  Date: 2023  Prepared by: Theo Vazquez    Exercises  - Seated Shoulder Inferior Glide  - 1 x daily - 7 x weekly - 1 sets - 3 reps - 20 sec hold  - Standing Shoulder Posterior Capsule Stretch  - 1 x daily - 7 x weekly - 1 sets - 3 reps - 20 sec hold  - Sleeper Stretch  - 1 x daily - 7 x weekly - 1 sets - 10 reps - 10 sec hold  - Shoulder External Rotation with Anchored Resistance  - 1 x daily - 7 x weekly - 2 sets - 20 reps  - Shoulder Internal Rotation with Resistance  - 1 x daily - 7 x weekly - 2 sets - 20 reps  - Shoulder Flexion Wall Slide with Towel  - 1 x daily - 7 x weekly - 1 sets - 10 reps  - Shoulder Scaption Wall Slide with Towel  - 1 x daily - 7 x weekly - 1 sets - 10 reps  - Sidelying Shoulder ER with Towel and Dumbbell  - 1 x daily - 7 x weekly - 2 sets - 10 reps  - Prone Shoulder Extension on Swiss Ball  - 1 x daily - 7 x weekly - 2 sets - 10 reps  - Prone Shoulder Horizontal Abduction on Swiss Ball  - 1 x daily - 7 x weekly - 2 sets - 10 reps  - Prone  Lower Trapezius Strengthening on Swiss Ball  - 1 x daily - 7 x weekly - 2 sets - 10 reps    Assessment/Plan  Doing well with tolerance to interventions thought no significant changes in pain levels as yet.    Progress strengthening /stabilization /functional activity as tolerated.  Assess response to today's interventions            Manual Therapy:         mins  39053;  Therapeutic Exercise:    20     mins  52561;     Neuromuscular Lady:    10    mins  55910;    Therapeutic Activity:     10     mins  07699;     Gait Training:           mins  76129;     Ultrasound:          mins  39211;    Electrical Stimulation:         mins  34258 ( );  Dry Needling          mins self-pay    Timed Treatment:   40   mins   Total Treatment:     40   mins    Kwasi Vazquez, PT  Physical Therapist

## 2023-12-29 ENCOUNTER — TREATMENT (OUTPATIENT)
Dept: PHYSICAL THERAPY | Facility: CLINIC | Age: 38
End: 2023-12-29
Payer: OTHER GOVERNMENT

## 2023-12-29 DIAGNOSIS — M25.312 ROTATOR CUFF INSUFFICIENCY, LEFT: ICD-10-CM

## 2023-12-29 DIAGNOSIS — G89.29 CHRONIC LEFT SHOULDER PAIN: Primary | ICD-10-CM

## 2023-12-29 DIAGNOSIS — M25.512 CHRONIC LEFT SHOULDER PAIN: Primary | ICD-10-CM

## 2023-12-29 PROCEDURE — 97110 THERAPEUTIC EXERCISES: CPT | Performed by: PHYSICAL THERAPIST

## 2023-12-29 PROCEDURE — 97530 THERAPEUTIC ACTIVITIES: CPT | Performed by: PHYSICAL THERAPIST

## 2023-12-29 NOTE — LETTER
Re-Assessment / Progress Note  [facility] [place] [place]  [address]  [place] [address]  Patient: Heron Pearson   : 1985  Diagnosis/ICD-10 Code:  Chronic left shoulder pain [M25.512, G89.29]  Referring practitioner: GERDA Mcmahan  Date of Initial Visit: Episode Type: THERAPY  Noted: 2023  No   Today's Date: 2023  Patient seen for 7 sessions.      Subjective:     Subjective Questionnaire: QuickDASH: 22% (22% at initial eval)  Subjective Questionnaire: QuickDASH Work (Symform - admin) - 0% (25% at initial eval)  Subjective Questionnaire: QuickDASH Sports ( Physical Training/Running/Working Out): 37% (50% at initial eval)  Clinical Progress: unchanged  Home Program Compliance: Yes  Treatment has included: therapeutic exercise, neuromuscular re-education, manual therapy, and therapeutic activity    Subjective     Heron Pearson reports: Not much overall improvement.  Still with pain at top/front of shoulder.  Tolerated most of exercises well, but pain with D1 flexion and some mild pain with cross-body stretch.    Pain  Current pain rating: 3  At best pain rating: 3  At worst pain ratin  Location: front and back of shoulder  Relieving factors: rest (motrin)  Exacerbated by: reaching up, out and behind the back, lying on it wrong, sleeping on it wrong.  Progression: no change    Objective     Postural Observations  Seated posture: good  Standing posture: good        Cervical/Thoracic Screen   Cervical range of motion within normal limits  Thoracic range of motion within normal limits     Active Range of Motion   Left Shoulder   Flexion: WFL and with pain  Abduction: WFL and with pain  External rotation 90°: 90 degrees without pain  Internal rotation 90°: WFL     Right Shoulder   Normal active range of motion     Scapular Mobility   Left Shoulder   Scapular mobility: WFL     Strength/Myotome Testing      Left Shoulder      Planes of Motion   Flexion: 5   Extension: 5    Abduction: 5   Adduction: 5   External rotation at 45°: 4+  Internal rotation at 0°: 5      Isolated Muscles   Subscapularis: 5   Supraspinatus: 4+      Tests      Left Shoulder   Positive painful arc.   Negative belly press, empty can, full can, lift-off, Speed's and Yergason'  Mild pain with AC sheer test    Assessment/Plan    Heron Pearson continues to have superior/anterior shoulder pain.  His range of motion has improved, but his pain has not diminished.  He does tolerate advance strengthening well with exception of D1 flexion resisted strengthening and some mild pain with cross-body stretch (post. Capsule).  Will hold pending orthopedic consult.  Please advise on continuing.  If OK to continue, see parameters below.      Progress toward previous goals: Partially Met    Goals    Short-term goals (STG): 3/4  Long-term goals (LTG): 2/6      STG (6 weeks)  1) Independent in home program for basic shoulder range of motion stretching (MET)   2) Demonstrates basic understanding of condition and their role in treatment progression (MET)   3) Tolerates initiation of shoulder strengthening (MET)   4) Demonstrates slight improvement in tolerance to daily activities as evidenced by QuickDash score of 10% or less. (NOT MET)         LTG (12 Weeks)  1) Independent in home program for self-management of his condition (NOT MET)   2) Demonstrates AROM  L shoulder flexion and abduction equal to 170 degrees or greater to allow for use of that upper extremity for reaching at or above head-height without pain (MET)   3) Demonstrates AROM L shoulder internal and external rotation at 90 degrees abduction equal to 80 degrees or greater to allow for use of that upper extremity for dressing and grooming (MET)   4) Demonstrates significant improvement in his tolerance to daily activities as evidenced by QuickDash score of 5% or less. (NOT MET)   5) MMT of L shoulder flexion/abduction/IR/ER equal to 5/5  to allow for ability to safely  use that upper extremity for lifting, carrying and overhead work (NOT MET)   6) Reports ability to return to working out without difficulty or significant shoulder pain. (NOT MET)         Recommendations: Continue as planned pending orthopedic consult.  Frequency;  2 x per week  Timeframe: 8 weeks  Prognosis to achieve goals: good    PT Signature: Kwasi Vazquez PT    Thank you for allowing us to care for your patient!

## 2023-12-29 NOTE — PROGRESS NOTES
Re-Assessment / Progress Note  [facility] [place]  [address]  [place] [address]  Patient: Heron Pearson   : 1985  Diagnosis/ICD-10 Code:  Chronic left shoulder pain [M25.512, G89.29]  Referring practitioner: GERDA Mcmahan  Date of Initial Visit: Episode Type: THERAPY  Noted: 2023  No   Today's Date: 2023  Patient seen for 7 sessions.      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  Still with pain at top/front of shoulder.  Tolerated most of exercises well, but pain with D1 flexion and some mild pain with cross-body stretch.    Pain  Current pain rating: 3  At best pain rating: 3  At worst pain ratin  Location: front and back of shoulder  Relieving factors: rest (motrin)  Exacerbated by: reaching up, out and behind the back, lying on it wrong, sleeping on it wrong.  Progression: no change    Objective     Postural Observations  Seated posture: good  Standing posture: good        Cervical/Thoracic Screen   Cervical range of motion within normal limits  Thoracic range of motion within normal limits     Active Range of Motion   Left Shoulder   Flexion: WFL and with pain  Abduction: WFL and with pain  External rotation 90°: 90 degrees without pain  Internal rotation 90°: WFL     Right Shoulder   Normal active range of motion     Scapular Mobility   Left Shoulder   Scapular mobility: WFL     Strength/Myotome Testing      Left Shoulder      Planes of Motion   Flexion: 5   Extension: 5    Abduction: 5   Adduction: 5   External rotation at 45°: 4+  Internal rotation at 0°: 5      Isolated Muscles   Subscapularis: 5   Supraspinatus: 4+      Tests      Left Shoulder   Positive painful arc.   Negative belly press, empty can, full can, lift-off, Speed's and Yergason'  Mild pain with AC sheer test    Assessment/Plan    Heron Pearson continues to have superior/anterior shoulder pain.  His range of motion has improved, but his pain has not diminished.  He does tolerate advance strengthening well with exception of D1 flexion resisted strengthening and some mild pain with cross-body stretch (post. Capsule).  Will hold pending orthopedic consult.  Please advise on continuing.  If OK to continue, see parameters below.      Progress toward previous goals: Partially Met    Goals    Short-term goals (STG): 3/4  Long-term goals (LTG): 2/6      STG (6 weeks)  1) Independent in home program for basic shoulder range of motion stretching (MET)   2) Demonstrates basic understanding of condition and their role in treatment progression (MET)   3) Tolerates initiation of shoulder strengthening (MET)   4) Demonstrates slight improvement in tolerance to daily activities as evidenced by QuickDash score of 10% or less. (NOT MET)         LTG (12 Weeks)  1) Independent in home program for self-management of his condition (NOT MET)   2) Demonstrates AROM  L shoulder flexion and abduction equal to 170 degrees or greater to allow for use of that upper extremity for reaching at or above head-height without pain (MET)   3) Demonstrates AROM L shoulder internal and external rotation at 90 degrees abduction equal to 80 degrees or greater to allow for use of that upper extremity for dressing and grooming (MET)   4) Demonstrates significant improvement in his tolerance to daily activities as evidenced by QuickDash score of 5% or less. (NOT MET)   5) MMT of L shoulder flexion/abduction/IR/ER equal to 5/5  to allow for ability to safely  use that upper extremity for lifting, carrying and overhead work (NOT MET)   6) Reports ability to return to working out without difficulty or significant shoulder pain. (NOT MET)         Recommendations: Continue as planned pending orthopedic consult.  Frequency;  2 x per week  Timeframe: 8 weeks  Prognosis to achieve goals: good    PT Signature: Kwasi Vazquez PT    Manual Therapy:                 mins  38873;  Therapeutic Exercise:    20     mins  02167;     Neuromuscular Lady:        mins  47097;    Therapeutic Activity:      10     mins  68501;     Gait Training:                      mins  11163;     Ultrasound:                          mins  25916;    Electrical Stimulation:         mins  92590 ( );  Dry Needling                       mins self-pay     Timed Treatment:   30  mins   Total Treatment:     30   mins

## 2024-01-02 DIAGNOSIS — Z11.4 ENCOUNTER FOR SCREENING FOR HIV: Primary | ICD-10-CM

## 2024-01-04 ENCOUNTER — OFFICE VISIT (OUTPATIENT)
Dept: ORTHOPEDIC SURGERY | Facility: CLINIC | Age: 39
End: 2024-01-04
Payer: OTHER GOVERNMENT

## 2024-01-04 VITALS — OXYGEN SATURATION: 99 % | WEIGHT: 249 LBS | HEART RATE: 74 BPM | HEIGHT: 74 IN | BODY MASS INDEX: 31.95 KG/M2

## 2024-01-04 DIAGNOSIS — M25.512 CHRONIC LEFT SHOULDER PAIN: Primary | ICD-10-CM

## 2024-01-04 DIAGNOSIS — G89.29 CHRONIC LEFT SHOULDER PAIN: Primary | ICD-10-CM

## 2024-01-04 NOTE — PROGRESS NOTES
"Heron is a 38 y.o. year old male presents to [facility]    Chief Complaint   Patient presents with    Left Shoulder - Pain, Initial Evaluation    Left Elbow - Pain, Initial Evaluation       History of Present Illness  Heron Pearson is a 38 y.o. male [employer] presents to clinic for evaluation of left shoulder/bicep pain that has been present since an incident in flag football in 2019, but worsening over the past 6 months. Qualifies the pain as dull ache, jolting/sharp quick pain with exacerbation.   Provocative factors: cleaning, bicep curl, chin ups,     Past treatments: greater than 4 weeks of physical therapy, ibuprofen, rest, activity modification, ice baths,       I have reviewed the patient's medical, family, and social history in detail and updated the computerized patient record.    Objective:  Pulse 74   Ht 186.7 cm (73.5\")   Wt 113 kg (249 lb)   SpO2 99%   BMI 32.41 kg/m²      Physical Exam    Vital signs reviewed.   General: No acute distress.  Eyes: conjunctiva clear  ENT: external ears atraumatic  CV: no peripheral edema  Resp: normal respiratory effort  Skin: no rashes or wounds; normal turgor  Psych: mood and affect appropriate; recent and remote memory intact  Neuro: sensation to light touch intact    MSK Exam  Left Shoulder:    No deformity or wounds, no redness or swelling appreciated.  Tenderness to palpation over the anterior joint line and bicipital groove  Painful arc forward flexion of 80- 120  Patient has full, AROM with forward flexion, external rotation and internal rotation.  No pain nor weakness with [place] and supraspinatus testing  Negative [place]; positive scarf  Negative Neer    Positive speed  Cuff Strength 4+ to 5/5 with isometric testing.  Belly press 5/5; lift off 5/5    Imaging:  XR Shoulder 2+ View Left (01/04/2024 08:11)         Assessment:  Diagnoses and all orders for this visit:    Left shoulder pain, unspecified chronicity  -     XR " Shoulder 2+ View Left    Plan: Recommend MRI to left shoulder for evaluation of bicep tendon. Follow up with Dr Gallo for review and further treatment planning.        Follow Up   Return for MRI f/u campos Gallo.  Patient was given instructions and counseling regarding his condition or for health maintenance advice. Please see specific information pulled into the AVS if appropriate.     EMR Dragon/Transcription disclaimer:    Much of this encounter note is an electronic transcription/translation of spoken language to printed text.  The electronic translation of spoken language may permit erroneous, or at times, nonsensical words or phrases to be inadvertently transcribed.  Although I have reviewed the note for such errors some may still exist.

## 2024-01-10 ENCOUNTER — TELEPHONE (OUTPATIENT)
Dept: ORTHOPEDIC SURGERY | Facility: CLINIC | Age: 39
End: 2024-01-10
Payer: OTHER GOVERNMENT

## 2024-01-10 NOTE — TELEPHONE ENCOUNTER
Call placed to patient and I explained to him that I spoke with Amber at [facility] and she informed me that a referral is needed for MRI to come from the patients[facility]. He voiced understanding and stated he would contact his PCP. I told him if he has any further questions to give a call back.

## 2024-01-22 ENCOUNTER — TELEPHONE (OUTPATIENT)
Dept: ORTHOPEDIC SURGERY | Facility: CLINIC | Age: 39
End: 2024-01-22

## 2024-01-22 NOTE — TELEPHONE ENCOUNTER
Caller: GREGG RICHARDSON    Relationship to patient: SELF    Best call back number:     Chief complaint: LEFT SHOULDER - MRI 01/26/24 AT [facility]    Type of visit: MRI FOLLOW UP    Requested date: ASAP    Additional notes: NEXT AVAILABLE WAS 2/22/24

## 2024-02-01 ENCOUNTER — TELEMEDICINE (OUTPATIENT)
Dept: FAMILY MEDICINE CLINIC | Facility: TELEHEALTH | Age: 39
End: 2024-02-01
Payer: OTHER GOVERNMENT

## 2024-02-01 ENCOUNTER — OFFICE VISIT (OUTPATIENT)
Dept: ORTHOPEDIC SURGERY | Facility: CLINIC | Age: 39
End: 2024-02-01
Payer: OTHER GOVERNMENT

## 2024-02-01 VITALS — BODY MASS INDEX: 33 KG/M2 | HEART RATE: 69 BPM | WEIGHT: 249 LBS | HEIGHT: 73 IN

## 2024-02-01 VITALS — WEIGHT: 249 LBS | TEMPERATURE: 98.6 F | HEIGHT: 73 IN | BODY MASS INDEX: 33 KG/M2

## 2024-02-01 DIAGNOSIS — M75.112 PARTIAL NONTRAUMATIC RUPTURE OF LEFT ROTATOR CUFF: Primary | ICD-10-CM

## 2024-02-01 DIAGNOSIS — J40 BRONCHITIS: Primary | ICD-10-CM

## 2024-02-01 DIAGNOSIS — M25.522 LEFT ELBOW PAIN: ICD-10-CM

## 2024-02-01 PROCEDURE — 99214 OFFICE O/P EST MOD 30 MIN: CPT | Performed by: ORTHOPAEDIC SURGERY

## 2024-02-01 RX ORDER — METHYLPREDNISOLONE 4 MG/1
TABLET ORAL
Qty: 21 TABLET | Refills: 0 | Status: SHIPPED | OUTPATIENT
Start: 2024-02-01

## 2024-02-01 RX ORDER — DEXTROMETHORPHAN HYDROBROMIDE AND PROMETHAZINE HYDROCHLORIDE 15; 6.25 MG/5ML; MG/5ML
5 SYRUP ORAL NIGHTLY PRN
Qty: 118 ML | Refills: 0 | Status: SHIPPED | OUTPATIENT
Start: 2024-02-01

## 2024-02-01 NOTE — PROGRESS NOTES
Patient ID: Heron Pearson is a 38 y.o. male.    Chief Complaint:    Chief Complaint   Patient presents with    Left Shoulder - Initial Evaluation, Pain     Pain 5        HPI:  This is a 38-year-old gentleman here with longstanding shoulder and elbow pain.  He said pain mainly in the abducted externally rotated position in the shoulder with overhead activity.  Not much pain at night.  There is been no specific injury.  He is a number of treatment to date.  The elbow has bothered him doing heavy lifting at the gym he feels like there is a pop and a catching sensation over the distal third of the humerus.  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  PQ FOR DUTY.    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    Hyperopia 09/19/2023    Low back strain 2018    Need for vaccination 09/19/2023    Nicotine dependence 11/21/2023    Other examination of ears and hearing 09/19/2023    Other issue of medical certificates 09/19/2023    Respirator - PQ    Other specified counseling 09/19/2023    Other specified counseling 09/19/2023    Sebaceous cyst 11/21/2023    scrotal    Sebaceous cyst of labia 09/19/2023    scrotal    Tennis elbow 2018       Past Surgical History:   Procedure Laterality Date    CYST REMOVAL  10/2007    1.5 cm Mid- central line back    VASECTOMY  02/2016       Family History   Problem  "Relation Age of Onset    Osteoarthritis Mother     No Known Problems Father     Diabetes Maternal Grandmother     Lung cancer Maternal Grandfather     Heart attack Paternal Grandfather     Stroke Paternal Grandfather           Social History     Occupational History    Not on file   Tobacco Use    Smoking status: Former     Packs/day: 1.00     Years: 10.00     Additional pack years: 0.00     Total pack years: 10.00     Types: Cigarettes     Start date: 2001     Quit date: 2011     Years since quittin.8    Smokeless tobacco: Former     Types: Snuff     Quit date: 2013   Vaping Use    Vaping Use: Never used   Substance and Sexual Activity    Alcohol use: Not Currently     Comment: Sober. 2013    Drug use: Never    Sexual activity: Yes     Partners: Female     Birth control/protection: Vasectomy      Review of Systems   Cardiovascular:  Negative for chest pain.   Musculoskeletal:  Positive for arthralgias.       Objective:    Pulse 69   Ht 185.4 cm (73\")   Wt 113 kg (249 lb)   BMI 32.85 kg/m²     Physical Examination:  Left shoulder demonstrates intact skin no areas of tenderness passive elevation 170 abduction 150 external rotation 50 internal rotation L5 with mild pain but no weakness on [place] testing Speed and supraspinatus are negative.  Belly press and liftoff are 5/5.  Left elbow demonstrates no pain over the antecubital fossa no pain over the distal bicep tendon there is mild tenderness at the musculotendinous junction of the distal third of the bicep no pain or weakness on resisted elbow flexion or forearm supination  Sensory and motor exam are intact all distributions. Radial pulse is palpable and capillary refill is less than two seconds to all digits.    Imaging:  Prior x-rays of the left shoulder reviewed my interpretation is of well-maintained joint spaces with moderate subacromial impingement MRI reviewed my interpretation is of impingement with bursitis shallow bursal sided " supraspinatus/supraspinatus tear  Assessment:  Left shoulder partial cuff tear  Left elbow pain    Plan:  Options discussed for his shoulder recommend activity modification stretching ice anti-inflammatories discussed the role of possible therapy and/or PRP but no surgical indication  For the elbow recommend MRI      Procedures         Disclaimer: Part of this note may be an electronic transcription/translation of spoken language to printed text using the Dragon Dictation System

## 2024-02-01 NOTE — PROGRESS NOTES
You have chosen to receive care through a telehealth visit.  Do you consent to use a video/audio connection for your medical care today? Yes     HPI  Heron Pearson is a 38 y.o. male  presents with complaint of cough. He reports that he has a cough that’s kept him awake most of by the past two nights. No fever, no chills, no body aches. Just coughing fits  Wife is getting over the flu. Additional symptoms that he is having are noted in the ROS portion of this visit. Over the counter he has tried cough drops, Delsym, Flonase and is taking Mucinex.     Review of Systems   Constitutional:  Negative for chills, fatigue and fever.   HENT:  Positive for postnasal drip. Negative for congestion, rhinorrhea, sinus pressure, sinus pain, sneezing and sore throat.    Respiratory:  Positive for cough. Negative for chest tightness, shortness of breath and wheezing.         Productive at times, brown   Gastrointestinal:  Negative for diarrhea and nausea.   Musculoskeletal:  Negative for myalgias.   Neurological:  Positive for headaches (with cough).       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  PQ FOR DUTY.    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    Hyperopia 09/19/2023    Low back strain 2018    Need for vaccination 09/19/2023    Nicotine dependence 11/21/2023    Other examination of ears and hearing 09/19/2023    Other issue of medical certificates 09/19/2023    Respirator - PQ    Other specified counseling 09/19/2023    Other  "specified counseling 2023    Sebaceous cyst 2023    scrotal    Sebaceous cyst of labia 2023    scrotal    Tennis elbow 2018       Family History   Problem Relation Age of Onset    Osteoarthritis Mother     No Known Problems Father     Diabetes Maternal Grandmother     Lung cancer Maternal Grandfather     Heart attack Paternal Grandfather     Stroke Paternal Grandfather        Social History     Socioeconomic History    Marital status:    Tobacco Use    Smoking status: Former     Packs/day: 1.00     Years: 10.00     Additional pack years: 0.00     Total pack years: 10.00     Types: Cigarettes     Start date: 2001     Quit date: 2011     Years since quittin.8    Smokeless tobacco: Former     Types: Snuff     Quit date: 2013   Vaping Use    Vaping Use: Never used   Substance and Sexual Activity    Alcohol use: Not Currently     Comment: Sober. 2013    Drug use: Never    Sexual activity: Yes     Partners: Female     Birth control/protection: Vasectomy       Heron Pearson  reports that he quit smoking about 12 years ago. His smoking use included cigarettes. He started smoking about 23 years ago. He has a 10.00 pack-year smoking history. He quit smokeless tobacco use about 10 years ago.  His smokeless tobacco use included snuff..       Temp 98.6 °F (37 °C) Comment: last night  Ht 185.4 cm (73\")   Wt 113 kg (249 lb)   BMI 32.85 kg/m²     PHYSICAL EXAM  Physical Exam   Constitutional: He is oriented to person, place, and time. He appears well-developed.   HENT:   Head: Normocephalic and atraumatic.   Nose: Nose normal.   Eyes: Lids are normal. Right eye exhibits no discharge. Left eye exhibits no discharge. Right conjunctiva is not injected. Left conjunctiva is not injected.   Pulmonary/Chest:  No respiratory distress.  Neurological: He is alert and oriented to person, place, and time. No cranial nerve deficit.   Psychiatric: He has a normal mood and affect. His speech is " normal and behavior is normal. Judgment and thought content normal.       Results for orders placed or performed in visit on 09/19/23   Hemoglobin A1c    Specimen: Blood   Result Value Ref Range    Hemoglobin A1C 5.40 4.80 - 5.60 %   T4, Free    Specimen: Blood   Result Value Ref Range    Free T4 1.01 0.93 - 1.70 ng/dL   Urinalysis With Culture If Indicated - Urine, Clean Catch    Specimen: Urine, Clean Catch   Result Value Ref Range    Color, UA Yellow Yellow, Straw    Appearance, UA Clear Clear    pH, UA 6.5 5.0 - 8.0    Specific Gravity, UA 1.022 1.005 - 1.030    Glucose, UA Negative Negative    Ketones, UA Negative Negative    Bilirubin, UA Negative Negative    Blood, UA Negative Negative    Protein, UA Negative Negative    Leuk Esterase, UA Negative Negative    Nitrite, UA Negative Negative    Urobilinogen, UA 0.2 E.U./dL 0.2 - 1.0 E.U./dL   Lipid Panel    Specimen: Blood   Result Value Ref Range    Total Cholesterol 139 0 - 200 mg/dL    Triglycerides 49 0 - 150 mg/dL    HDL Cholesterol 61 (H) 40 - 60 mg/dL    LDL Cholesterol  67 0 - 100 mg/dL    VLDL Cholesterol 11 5 - 40 mg/dL    LDL/HDL Ratio 1.12    Hepatitis C Antibody    Specimen: Blood   Result Value Ref Range    Hepatitis C Ab Non-Reactive Non-Reactive   Comprehensive Metabolic Panel    Specimen: Blood   Result Value Ref Range    Glucose 97 65 - 99 mg/dL    BUN 17 6 - 20 mg/dL    Creatinine 1.05 0.76 - 1.27 mg/dL    Sodium 140 136 - 145 mmol/L    Potassium 4.0 3.5 - 5.2 mmol/L    Chloride 103 98 - 107 mmol/L    CO2 28.1 22.0 - 29.0 mmol/L    Calcium 10.2 8.6 - 10.5 mg/dL    Total Protein 7.4 6.0 - 8.5 g/dL    Albumin 5.1 3.5 - 5.2 g/dL    ALT (SGPT) 33 1 - 41 U/L    AST (SGOT) 30 1 - 40 U/L    Alkaline Phosphatase 39 39 - 117 U/L    Total Bilirubin 0.6 0.0 - 1.2 mg/dL    Globulin 2.3 gm/dL    A/G Ratio 2.2 g/dL    BUN/Creatinine Ratio 16.2 7.0 - 25.0    Anion Gap 8.9 5.0 - 15.0 mmol/L    eGFR 92.6 >60.0 mL/min/1.73   PSA Screen    Specimen: Blood    Result Value Ref Range    PSA 0.891 0.000 - 4.000 ng/mL   TSH    Specimen: Blood   Result Value Ref Range    TSH 3.530 0.270 - 4.200 uIU/mL   Testosterone    Specimen: Blood   Result Value Ref Range    Testosterone, Total 415.00 249.00 - 836.00 ng/dL   CBC Auto Differential    Specimen: Blood   Result Value Ref Range    WBC 5.90 3.40 - 10.80 10*3/mm3    RBC 5.07 4.14 - 5.80 10*6/mm3    Hemoglobin 15.6 13.0 - 17.7 g/dL    Hematocrit 45.1 37.5 - 51.0 %    MCV 89.0 79.0 - 97.0 fL    MCH 30.8 26.6 - 33.0 pg    MCHC 34.6 31.5 - 35.7 g/dL    RDW 12.5 12.3 - 15.4 %    RDW-SD 40.4 37.0 - 54.0 fl    MPV 9.5 6.0 - 12.0 fL    Platelets 244 140 - 450 10*3/mm3    Neutrophil % 60.2 42.7 - 76.0 %    Lymphocyte % 26.6 19.6 - 45.3 %    Monocyte % 11.2 5.0 - 12.0 %    Eosinophil % 1.0 0.3 - 6.2 %    Basophil % 0.7 0.0 - 1.5 %    Immature Grans % 0.3 0.0 - 0.5 %    Neutrophils, Absolute 3.55 1.70 - 7.00 10*3/mm3    Lymphocytes, Absolute 1.57 0.70 - 3.10 10*3/mm3    Monocytes, Absolute 0.66 0.10 - 0.90 10*3/mm3    Eosinophils, Absolute 0.06 0.00 - 0.40 10*3/mm3    Basophils, Absolute 0.04 0.00 - 0.20 10*3/mm3    Immature Grans, Absolute 0.02 0.00 - 0.05 10*3/mm3    nRBC 0.0 0.0 - 0.2 /100 WBC   [place] Urine Culture Tube - Urine, Clean Catch    Specimen: Urine, Clean Catch   Result Value Ref Range    Extra Tube Hold for add-ons.        Diagnoses and all orders for this visit:    1. Bronchitis (Primary)    Other orders  -     methylPREDNISolone (MEDROL) 4 MG dose pack; Take as directed on package instructions.  Dispense: 21 tablet; Refill: 0  -     promethazine-dextromethorphan (PROMETHAZINE-DM) 6.25-15 MG/5ML syrup; Take 5 mL by mouth At Night As Needed for Cough.  Dispense: 118 mL; Refill: 0    Take Medrol with food as early in the day as possible  Do not take Medrol with nsaids such as ibuprofen, aleve, or aspirin  May take tylenol for pain or fever  Continue Mucinex with plenty of fluids especially water to thin secretions and  help with congestion.  Do not drive after taking promethazine DM as it may make you drowsy    FOLLOW-UP  If symptoms worsen or persist follow up with PCP, Virtual Care or Urgent Care    Patient verbalizes understanding of medication dosage, comfort measures, instructions for treatment and follow-up.    Camille Davies, APRN  02/01/2024  08:13 EST    The use of a video visit has been reviewed with the patient and verbal informed consent has been obtained. Myself and Heron Pearson participated in this visit. The patient is located in [address] [place] [address] [place] [facility].    I am located in [place], [place]. [employer] and [employer] were utilized. I spent 25 minutes in the patient's chart for this visit.

## 2024-02-01 NOTE — PATIENT INSTRUCTIONS
MRI follow-up instructions    Today at your office visit, Dr. Gallo recommended an MRI (magnetic resonance imaging) to evaluate your joint pain.  This requires a precertification process, which our office will do, and then we will contact you when it is approved and go over scheduling options.  We typically recommend these to be performed at [facility] or [facility].  If for some reason it is performed elsewhere please arrange to have that facility give you a disc with your images on it so Dr. Gallo can review it at your follow-up visit.    When checking out today we recommend making an appointment to go over your results in approximately two weeks.  If your MRI is done sooner than that we would be happy to schedule you sooner to go over your results, just contact us at [phone] or through the [employer] portal to let us know your MRI is completed.  Seeing you in person for the results gives us the best opportunity to look at your images together and explain the diagnosis and treatment options to best help you.

## 2024-03-02 DIAGNOSIS — F41.9 ANXIETY: ICD-10-CM

## 2024-03-04 RX ORDER — PROPRANOLOL HYDROCHLORIDE 10 MG/1
10 TABLET ORAL 3 TIMES DAILY
Qty: 90 TABLET | Refills: 0 | Status: SHIPPED | OUTPATIENT
Start: 2024-03-04

## 2024-04-15 ENCOUNTER — OFFICE VISIT (OUTPATIENT)
Dept: ORTHOPEDIC SURGERY | Facility: CLINIC | Age: 39
End: 2024-04-15
Payer: OTHER GOVERNMENT

## 2024-04-15 VITALS — BODY MASS INDEX: 33 KG/M2 | RESPIRATION RATE: 20 BRPM | OXYGEN SATURATION: 99 % | WEIGHT: 249 LBS | HEIGHT: 73 IN

## 2024-04-15 DIAGNOSIS — M25.522 LEFT ELBOW PAIN: Primary | ICD-10-CM

## 2024-04-15 PROCEDURE — 99212 OFFICE O/P EST SF 10 MIN: CPT | Performed by: ORTHOPAEDIC SURGERY

## 2024-04-15 NOTE — PROGRESS NOTES
"     Patient ID: Heron Pearson is a 38 y.o. male.  Left elbow pain    Review of Systems:        Objective:    Resp 20   Ht 185.4 cm (73\")   Wt 113 kg (249 lb)   SpO2 99%   BMI 32.85 kg/m²     Physical Examination:   Left elbow demonstrates no pain over the antecubital fossa no pain over the distal bicep tendon there is mild tenderness at the musculotendinous junction of the distal third of the bicep no pain or weakness on resisted elbow flexion or forearm supination  Sensory and motor exam are intact all distributions. Radial pulse is palpable and capillary refill is less than two seconds to all digits.       Imaging:   MRI reviewed demonstrates mild amount of fluid in the distal bicep bursa with tendinosis but intact tendon    Assessment:    Left bicep tendinitis    Plan:     Discussed the findings and MRI rationale for conservative treatment he is going to continue activities as tolerated with symptomatic management as needed see me as needed    Procedures          Disclaimer: Part of this note may be an electronic transcription/translation of spoken language to printed text using the Dragon Dictation System  "

## 2024-06-24 DIAGNOSIS — F41.9 ANXIETY: ICD-10-CM

## 2024-06-24 RX ORDER — PROPRANOLOL HYDROCHLORIDE 10 MG/1
10 TABLET ORAL 3 TIMES DAILY
Qty: 90 TABLET | Refills: 2 | Status: SHIPPED | OUTPATIENT
Start: 2024-06-24

## 2024-08-19 ENCOUNTER — OFFICE VISIT (OUTPATIENT)
Dept: FAMILY MEDICINE CLINIC | Facility: CLINIC | Age: 39
End: 2024-08-19
Payer: OTHER GOVERNMENT

## 2024-08-19 VITALS
WEIGHT: 259.9 LBS | OXYGEN SATURATION: 99 % | RESPIRATION RATE: 18 BRPM | SYSTOLIC BLOOD PRESSURE: 132 MMHG | HEART RATE: 74 BPM | HEIGHT: 73 IN | DIASTOLIC BLOOD PRESSURE: 90 MMHG | BODY MASS INDEX: 34.45 KG/M2 | TEMPERATURE: 97.8 F

## 2024-08-19 DIAGNOSIS — M79.672 PAIN OF LEFT HEEL: Primary | ICD-10-CM

## 2024-08-19 PROCEDURE — 99212 OFFICE O/P EST SF 10 MIN: CPT | Performed by: NURSE PRACTITIONER

## 2024-08-19 NOTE — PROGRESS NOTES
"Chief Complaint  Ankle Pain (Left ankle pain for about 5 or 6 weeks )    Subjective        Heron Pearson presents to [facility]  History of Present Illness    Left heel pain:   Onset of pain began 5-6 weeks ago. Severity of pain is mild to moderate. Frequency is intermittent.  Status is no change.  Location is achilles.  There is no radiation. The quality of the pain is achy, sharp.  There is no injury. Context includes jumping rope for workout (typical activity).  Aggravating factors include flexing, bending, rotating right, standing, and walking.  Relieving factors include not jumping rope, rest, walking on incline, ice, and motrin.  There is associated tenderness.  Pertinent negatives include crepitus, decreased mobility, fever, bruising, difficulty initiating sleep, joint instability, limping, locking, nocturnal awakening, numbness, weakness, popping, spasms, swelling, tingling in legs.         Objective   Vital Signs:  /90 (BP Location: Left arm, Patient Position: Sitting, Cuff Size: Adult)   Pulse 74   Temp 97.8 °F (36.6 °C)   Resp 18   Ht 185.4 cm (73\")   Wt 118 kg (259 lb 14.4 oz)   SpO2 99%   BMI 34.29 kg/m²   Estimated body mass index is 34.29 kg/m² as calculated from the following:    Height as of this encounter: 185.4 cm (73\").    Weight as of this encounter: 118 kg (259 lb 14.4 oz).            Physical Exam  Constitutional:       General: He is not in acute distress.     Appearance: He is well-developed and well-groomed.   Pulmonary:      Effort: Pulmonary effort is normal.   Musculoskeletal:      Left ankle: No swelling or ecchymosis. No tenderness. Normal range of motion. Normal pulse.      Left Achilles Tendon: Tenderness present.   Skin:     General: Skin is warm and dry.   Neurological:      Mental Status: He is alert and oriented to person, place, and time.      Gait: Gait is intact.   Psychiatric:         Mood and Affect: Mood normal.         Thought " Content: Thought content normal.         Judgment: Judgment normal.        Result Review :                Assessment and Plan   Diagnoses and all orders for this visit:    1. Pain of left heel (Primary)  -     Ambulatory Referral to Orthopedic Surgery             Follow Up   Return if symptoms worsen or fail to improve.  Patient was given instructions and counseling regarding his condition or for health maintenance advice. Please see specific information pulled into the AVS if appropriate.             Answers submitted by the patient for this visit:  Primary Reason for Visit (Submitted on 8/17/2024)  What is the primary reason for your visit?: Extremity Pain  Lower Extremity Injury Questionnaire (Submitted on 8/17/2024)  Chief Complaint: Extremity pain  Injury: Yes  Injury mechanism: other  Foreign body present: no foreign bodies

## 2024-09-23 ENCOUNTER — OFFICE VISIT (OUTPATIENT)
Dept: FAMILY MEDICINE CLINIC | Facility: CLINIC | Age: 39
End: 2024-09-23
Payer: OTHER GOVERNMENT

## 2024-09-23 VITALS
HEART RATE: 86 BPM | TEMPERATURE: 97.5 F | BODY MASS INDEX: 34.64 KG/M2 | OXYGEN SATURATION: 98 % | DIASTOLIC BLOOD PRESSURE: 86 MMHG | RESPIRATION RATE: 18 BRPM | WEIGHT: 261.4 LBS | SYSTOLIC BLOOD PRESSURE: 122 MMHG | HEIGHT: 73 IN

## 2024-09-23 DIAGNOSIS — Z00.00 ANNUAL PHYSICAL EXAM: Primary | ICD-10-CM

## 2024-09-23 DIAGNOSIS — J45.990 EXERCISE-INDUCED BRONCHOSPASM: ICD-10-CM

## 2024-09-23 DIAGNOSIS — Z12.83 SCREENING FOR SKIN CANCER: ICD-10-CM

## 2024-09-23 DIAGNOSIS — R39.9 LOWER URINARY TRACT SYMPTOMS: ICD-10-CM

## 2024-09-23 DIAGNOSIS — Z12.5 SCREENING PSA (PROSTATE SPECIFIC ANTIGEN): ICD-10-CM

## 2024-09-23 DIAGNOSIS — F41.9 ANXIETY: ICD-10-CM

## 2024-09-23 DIAGNOSIS — Z77.011 EXPOSURE TO LEAD: ICD-10-CM

## 2024-09-23 PROCEDURE — 99396 PREV VISIT EST AGE 40-64: CPT | Performed by: NURSE PRACTITIONER

## 2024-09-23 RX ORDER — ALBUTEROL SULFATE 90 UG/1
2 INHALANT RESPIRATORY (INHALATION) EVERY 4 HOURS PRN
Qty: 8 G | Refills: 1 | Status: SHIPPED | OUTPATIENT
Start: 2024-09-23

## 2024-09-26 DIAGNOSIS — F41.9 ANXIETY: ICD-10-CM

## 2024-09-26 RX ORDER — PROPRANOLOL HCL 10 MG
10 TABLET ORAL 3 TIMES DAILY
Qty: 90 TABLET | Refills: 2 | Status: SHIPPED | OUTPATIENT
Start: 2024-09-26

## 2024-09-27 ENCOUNTER — LAB (OUTPATIENT)
Dept: FAMILY MEDICINE CLINIC | Facility: CLINIC | Age: 39
End: 2024-09-27
Payer: OTHER GOVERNMENT

## 2024-09-27 ENCOUNTER — LAB (OUTPATIENT)
Dept: LAB | Facility: HOSPITAL | Age: 39
End: 2024-09-27
Payer: OTHER GOVERNMENT

## 2024-09-27 DIAGNOSIS — Z77.011 EXPOSURE TO LEAD: ICD-10-CM

## 2024-09-27 DIAGNOSIS — Z12.5 SCREENING PSA (PROSTATE SPECIFIC ANTIGEN): ICD-10-CM

## 2024-09-27 DIAGNOSIS — Z00.00 ANNUAL PHYSICAL EXAM: ICD-10-CM

## 2024-09-27 DIAGNOSIS — R39.9 LOWER URINARY TRACT SYMPTOMS: ICD-10-CM

## 2024-09-27 LAB
ALBUMIN SERPL-MCNC: 5 G/DL (ref 3.5–5.2)
ALBUMIN/GLOB SERPL: 1.9 G/DL
ALP SERPL-CCNC: 50 U/L (ref 39–117)
ALT SERPL W P-5'-P-CCNC: 53 U/L (ref 1–41)
ANION GAP SERPL CALCULATED.3IONS-SCNC: 11 MMOL/L (ref 5–15)
AST SERPL-CCNC: 35 U/L (ref 1–40)
BASOPHILS # BLD AUTO: 0.05 10*3/MM3 (ref 0–0.2)
BASOPHILS NFR BLD AUTO: 0.9 % (ref 0–1.5)
BILIRUB SERPL-MCNC: 0.8 MG/DL (ref 0–1.2)
BILIRUB UR QL STRIP: NEGATIVE
BUN SERPL-MCNC: 16 MG/DL (ref 6–20)
BUN/CREAT SERPL: 14.3 (ref 7–25)
CALCIUM SPEC-SCNC: 10 MG/DL (ref 8.6–10.5)
CHLORIDE SERPL-SCNC: 99 MMOL/L (ref 98–107)
CHOLEST SERPL-MCNC: 164 MG/DL (ref 0–200)
CLARITY UR: CLEAR
CO2 SERPL-SCNC: 27 MMOL/L (ref 22–29)
COLOR UR: YELLOW
CREAT SERPL-MCNC: 1.12 MG/DL (ref 0.76–1.27)
DEPRECATED RDW RBC AUTO: 38.1 FL (ref 37–54)
EGFRCR SERPLBLD CKD-EPI 2021: 85.2 ML/MIN/1.73
EOSINOPHIL # BLD AUTO: 0.13 10*3/MM3 (ref 0–0.4)
EOSINOPHIL NFR BLD AUTO: 2.3 % (ref 0.3–6.2)
ERYTHROCYTE [DISTWIDTH] IN BLOOD BY AUTOMATED COUNT: 11.8 % (ref 12.3–15.4)
GLOBULIN UR ELPH-MCNC: 2.6 GM/DL
GLUCOSE SERPL-MCNC: 96 MG/DL (ref 65–99)
GLUCOSE UR STRIP-MCNC: NEGATIVE MG/DL
HBA1C MFR BLD: 5 % (ref 4.8–5.6)
HCT VFR BLD AUTO: 46.7 % (ref 37.5–51)
HDLC SERPL-MCNC: 57 MG/DL (ref 40–60)
HGB BLD-MCNC: 15.8 G/DL (ref 13–17.7)
HGB UR QL STRIP.AUTO: NEGATIVE
HOLD SPECIMEN: NORMAL
IMM GRANULOCYTES # BLD AUTO: 0.02 10*3/MM3 (ref 0–0.05)
IMM GRANULOCYTES NFR BLD AUTO: 0.3 % (ref 0–0.5)
KETONES UR QL STRIP: NEGATIVE
LDLC SERPL CALC-MCNC: 93 MG/DL (ref 0–100)
LDLC/HDLC SERPL: 1.62 {RATIO}
LEUKOCYTE ESTERASE UR QL STRIP.AUTO: NEGATIVE
LYMPHOCYTES # BLD AUTO: 2 10*3/MM3 (ref 0.7–3.1)
LYMPHOCYTES NFR BLD AUTO: 35 % (ref 19.6–45.3)
MCH RBC QN AUTO: 30.3 PG (ref 26.6–33)
MCHC RBC AUTO-ENTMCNC: 33.8 G/DL (ref 31.5–35.7)
MCV RBC AUTO: 89.5 FL (ref 79–97)
MONOCYTES # BLD AUTO: 0.6 10*3/MM3 (ref 0.1–0.9)
MONOCYTES NFR BLD AUTO: 10.5 % (ref 5–12)
NEUTROPHILS NFR BLD AUTO: 2.92 10*3/MM3 (ref 1.7–7)
NEUTROPHILS NFR BLD AUTO: 51 % (ref 42.7–76)
NITRITE UR QL STRIP: NEGATIVE
NRBC BLD AUTO-RTO: 0 /100 WBC (ref 0–0.2)
PH UR STRIP.AUTO: 8 [PH] (ref 5–8)
PLATELET # BLD AUTO: 272 10*3/MM3 (ref 140–450)
PMV BLD AUTO: 9.2 FL (ref 6–12)
POTASSIUM SERPL-SCNC: 4.8 MMOL/L (ref 3.5–5.2)
PROT SERPL-MCNC: 7.6 G/DL (ref 6–8.5)
PROT UR QL STRIP: NEGATIVE
PSA SERPL-MCNC: 0.86 NG/ML (ref 0–4)
RBC # BLD AUTO: 5.22 10*6/MM3 (ref 4.14–5.8)
SODIUM SERPL-SCNC: 137 MMOL/L (ref 136–145)
SP GR UR STRIP: 1.01 (ref 1–1.03)
T4 FREE SERPL-MCNC: 0.86 NG/DL (ref 0.93–1.7)
TESTOST SERPL-MCNC: 569 NG/DL (ref 249–836)
TRIGL SERPL-MCNC: 73 MG/DL (ref 0–150)
TSH SERPL DL<=0.05 MIU/L-ACNC: 7.09 UIU/ML (ref 0.27–4.2)
UROBILINOGEN UR QL STRIP: NORMAL
VLDLC SERPL-MCNC: 14 MG/DL (ref 5–40)
WBC NRBC COR # BLD AUTO: 5.72 10*3/MM3 (ref 3.4–10.8)

## 2024-09-27 PROCEDURE — 82525 ASSAY OF COPPER: CPT

## 2024-09-27 PROCEDURE — 85025 COMPLETE CBC W/AUTO DIFF WBC: CPT

## 2024-09-27 PROCEDURE — 83036 HEMOGLOBIN GLYCOSYLATED A1C: CPT

## 2024-09-27 PROCEDURE — 81003 URINALYSIS AUTO W/O SCOPE: CPT

## 2024-09-27 PROCEDURE — G0103 PSA SCREENING: HCPCS

## 2024-09-27 PROCEDURE — 84403 ASSAY OF TOTAL TESTOSTERONE: CPT

## 2024-09-27 PROCEDURE — 80053 COMPREHEN METABOLIC PANEL: CPT

## 2024-09-27 PROCEDURE — 80061 LIPID PANEL: CPT

## 2024-09-27 PROCEDURE — 83655 ASSAY OF LEAD: CPT

## 2024-09-27 PROCEDURE — 84443 ASSAY THYROID STIM HORMONE: CPT

## 2024-09-27 PROCEDURE — 84439 ASSAY OF FREE THYROXINE: CPT

## 2024-09-28 DIAGNOSIS — R94.6 ABNORMAL THYROID FUNCTION TEST: Primary | ICD-10-CM

## 2024-09-28 LAB — LEAD BLDV-MCNC: <1 UG/DL (ref 0–3.4)

## 2024-10-01 LAB — COPPER SERPL-MCNC: 82 UG/DL (ref 69–132)

## 2024-10-03 ENCOUNTER — OFFICE VISIT (OUTPATIENT)
Dept: PODIATRY | Facility: CLINIC | Age: 39
End: 2024-10-03
Payer: OTHER GOVERNMENT

## 2024-10-03 VITALS — WEIGHT: 261 LBS | RESPIRATION RATE: 20 BRPM | BODY MASS INDEX: 34.59 KG/M2 | HEIGHT: 73 IN

## 2024-10-03 DIAGNOSIS — M21.862 ACQUIRED POSTERIOR EQUINUS, LEFT: ICD-10-CM

## 2024-10-03 DIAGNOSIS — M92.62 HAGLUND'S DEFORMITY, LEFT: ICD-10-CM

## 2024-10-03 DIAGNOSIS — M76.62 ACHILLES TENDINITIS, LEFT LEG: ICD-10-CM

## 2024-10-03 DIAGNOSIS — M25.572 ACUTE LEFT ANKLE PAIN: Primary | ICD-10-CM

## 2024-10-03 RX ORDER — AMOXICILLIN 500 MG/1
CAPSULE ORAL
COMMUNITY
Start: 2024-09-30

## 2024-10-03 NOTE — PROGRESS NOTES
10/03/2024  Foot and Ankle Surgery - New Patient   Provider: Dr. Carlyle Perea DPM  Location: [facility]    Subjective:  Heron Pearson is a 38 y.o. male.     Chief Complaint   Patient presents with    Left Foot - Pain, Initial Evaluation    Initial Evaluation     CASEY Vega lorie  9/23/2024       History of Present Illness  The patient presents for evaluation of left ankle pain.    He has been experiencing pain in his left Achilles tendon for the past 6 weeks, a problem he has not encountered before. Despite being an active runner for over two decades, he cannot recall any specific injury that might have triggered this pain. He suspects that the onset of the pain might be related to performing double unders, a type of exercise involving jumping rope. The pain is most noticeable when his foot is fully extended or retracted. He has made modifications to his exercise routine to avoid exacerbating the pain.    He is currently serving in [employer] and plans to retire next summer.       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  PQ FOR DUTY.    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    Hyperopia 09/19/2023    Ingrown toenail 2010    Resolved    Low back strain 2018    Need for vaccination 09/19/2023    Nicotine dependence 11/21/2023    Other examination of ears and hearing 09/19/2023    Other issue of medical certificates 09/19/2023    Respirator - PQ    Other specified counseling  "2023    Other specified counseling 2023    Sebaceous cyst 2023    scrotal    Sebaceous cyst of labia 2023    scrotal    Shin splints 2009    Resolved    Tennis elbow 2018       Past Surgical History:   Procedure Laterality Date    CYST REMOVAL  10/2007    1.5 cm Mid- central line back    VASECTOMY  2016       Family History   Problem Relation Age of Onset    Osteoarthritis Mother     No Known Problems Father     Diabetes Maternal Grandmother     Lung cancer Maternal Grandfather     Heart attack Paternal Grandfather     Stroke Paternal Grandfather        Social History     Socioeconomic History    Marital status:    Tobacco Use    Smoking status: Former     Current packs/day: 0.00     Average packs/day: 1 pack/day for 10.2 years (10.2 ttl pk-yrs)     Types: Cigarettes     Start date: 2001     Quit date: 2011     Years since quittin.5     Passive exposure: Past    Smokeless tobacco: Former     Types: Snuff     Quit date: 2013   Vaping Use    Vaping status: Never Used   Substance and Sexual Activity    Alcohol use: Not Currently     Comment: Sober. 2013    Drug use: Never    Sexual activity: Yes     Partners: Female     Birth control/protection: Vasectomy        Current Outpatient Medications on File Prior to Visit   Medication Sig Dispense Refill    albuterol sulfate  (90 Base) MCG/ACT inhaler Inhale 2 puffs Every 4 (Four) Hours As Needed for Shortness of Air. 8 g 1    amoxicillin (AMOXIL) 500 MG capsule       multivitamin with minerals (MULTIVITAMIN ADULT PO) Take 1 tablet by mouth Daily.      propranolol (INDERAL) 10 MG tablet Take 1 tablet by mouth 3 (Three) Times a Day. Update provider 90 tablet 2    Zinc-Magnesium Aspart-Vit B6 (ZINC MAGNESIUM ASPARTATE PO) Take 1 tablet by mouth Daily.       No current facility-administered medications on file prior to visit.         Objective   Resp 20   Ht 185.4 cm (73\")   Wt 118 kg (261 lb)   BMI 34.43 kg/m² "     Foot/Ankle Exam    GENERAL  Appearance:  appears stated age  Orientation:  AAOx3  Affect:  appropriate    VASCULAR     Left Foot Vascularity   Normal vascular exam    Dorsalis pedis:  2+  Posterior tibial:  2+  Skin temperature:  warm  Edema grading:  None  CFT:  < 3 seconds  Pedal hair growth:  Present  Varicosities:  none     NEUROLOGIC     Left Foot Neurologic   Light touch sensation: normal  Hot/Cold sensation:  normal  Achilles reflex:  2+    MUSCULOSKELETAL     Left Foot Musculoskeletal   Ecchymosis:  none  Tenderness:  none  Arch:  Normal    MUSCLE STRENGTH     Left Foot Muscle Strength   Normal strength    Foot dorsiflexion:  5  Foot plantar flexion:  5  Foot inversion:  5  Foot eversion:  5    DERMATOLOGIC        Left Foot Dermatologic   Skin  Left foot skin is intact.      Left foot additional comments: Minimal discomfort with palpation involving the posterior aspect of the calcaneus.  No obvious Achilles tendon deficit or hypertrophy.  Small Lorrie's deformity.    Physical Exam         Results  Imaging  X-ray of the left ankle shows a previous injury, some tightness, and a little bit of a spur.       Assessment & Plan   Diagnoses and all orders for this visit:    1. Acute left ankle pain (Primary)  -     XR Ankle 3+ View Left    2. Achilles tendinitis, left leg    3. Acquired posterior equinus, left    4. Lorrie's deformity, left       Assessment & Plan    Symptoms and physical examination findings are consistent with insertional Achilles tendinitis. Stretching exercises are recommended 3 to 5 times daily. Supportive tennis shoes and over-the-counter arch support are advised. Barefoot walking and wearing flip flops should be avoided, especially during increased activity. Running on flat surfaces is preferred, and activities such as running on uneven terrain, jumping, stairs, and uphill inclines should be modified to reduce risk. If symptoms persist or worsen, further treatment options, including  surgical intervention, will be considered.Reviewed proper basic stretching and manual therapy exercises along with appropriate shoes and activity.  Discussed proper use and/or avoidance of OTC anti-inflammatories.  Patient is to call with any additional issues or concerns.  Greater than 30 minutes was spent before, during, and after evaluation for patient care.    Follow-up  Return in 3 months for follow up           Patient or patient representative verbalized consent for the use of Ambient Listening during the visit with  MELONIE Perea DPM for chart documentation. 10/3/2024  15:50 EDT    MELONIE Perea DPM

## 2024-10-11 ENCOUNTER — CLINICAL SUPPORT (OUTPATIENT)
Dept: FAMILY MEDICINE CLINIC | Facility: CLINIC | Age: 39
End: 2024-10-11
Payer: OTHER GOVERNMENT

## 2024-10-11 DIAGNOSIS — Z23 NEED FOR VACCINATION: Primary | ICD-10-CM

## 2024-10-11 PROCEDURE — 90471 IMMUNIZATION ADMIN: CPT | Performed by: NURSE PRACTITIONER

## 2024-10-11 PROCEDURE — 90656 IIV3 VACC NO PRSV 0.5 ML IM: CPT | Performed by: NURSE PRACTITIONER

## 2024-10-11 NOTE — PROGRESS NOTES
Injection  Injection performed in LT DELTOID by Greer Waters MA. Patient tolerated the procedure well without complications.  10/11/24   Greer Waters MA

## 2024-11-26 ENCOUNTER — TELEPHONE (OUTPATIENT)
Dept: FAMILY MEDICINE CLINIC | Facility: CLINIC | Age: 39
End: 2024-11-26

## 2024-11-26 NOTE — TELEPHONE ENCOUNTER
"    Hub staff attempted to follow warm transfer process and was unsuccessful     Caller: Heron Pearson \"Fly\"    Relationship to patient: Self    Best call back number:     [phone]       Patient is needing: NEEDING TO SET UP A LAB APPOINTMENT        "

## 2024-12-23 ENCOUNTER — LAB (OUTPATIENT)
Dept: FAMILY MEDICINE CLINIC | Facility: CLINIC | Age: 39
End: 2024-12-23
Payer: OTHER GOVERNMENT

## 2024-12-23 DIAGNOSIS — R94.6 ABNORMAL THYROID FUNCTION TEST: ICD-10-CM

## 2024-12-23 LAB — TSH SERPL DL<=0.05 MIU/L-ACNC: 3 UIU/ML (ref 0.27–4.2)

## 2024-12-23 PROCEDURE — 84443 ASSAY THYROID STIM HORMONE: CPT | Performed by: NURSE PRACTITIONER

## 2025-01-08 ENCOUNTER — OFFICE VISIT (OUTPATIENT)
Age: 40
End: 2025-01-08
Payer: OTHER GOVERNMENT

## 2025-01-08 VITALS
RESPIRATION RATE: 18 BRPM | WEIGHT: 261 LBS | HEART RATE: 73 BPM | BODY MASS INDEX: 34.59 KG/M2 | OXYGEN SATURATION: 97 % | HEIGHT: 73 IN

## 2025-01-08 DIAGNOSIS — M76.62 ACHILLES TENDINITIS, LEFT LEG: ICD-10-CM

## 2025-01-08 DIAGNOSIS — M25.572 ACUTE LEFT ANKLE PAIN: Primary | ICD-10-CM

## 2025-01-08 DIAGNOSIS — M21.862 ACQUIRED POSTERIOR EQUINUS, LEFT: ICD-10-CM

## 2025-01-08 DIAGNOSIS — M92.62 HAGLUND'S DEFORMITY, LEFT: ICD-10-CM

## 2025-01-09 NOTE — PROGRESS NOTES
"01/08/2025  Foot and Ankle Surgery - Established Patient/Follow-up  Provider: Dr. Carlyle Perea DPM  Location: [facility]    Subjective:  Heron Pearson is a 39 y.o. male.     Chief Complaint   Patient presents with    Left Foot - Pain, Follow-up       History of Present Illness  The patient presents for a 3-month follow-up.    He reports no significant change in his condition since the last visit. He has discontinued activities such as box jumps and double unders, which were previously identified as aggravating factors. Despite these modifications, he continues to experience mild discomfort, rating it as 2 or 3 on a scale of 10. The pain is primarily localized to the back of his leg. He has been diligent in performing stretching exercises and reports no current pain. He also reports no discomfort in his ankle.      No Known Allergies    Current Outpatient Medications on File Prior to Visit   Medication Sig Dispense Refill    albuterol sulfate  (90 Base) MCG/ACT inhaler Inhale 2 puffs Every 4 (Four) Hours As Needed for Shortness of Air. 8 g 1    multivitamin with minerals (MULTIVITAMIN ADULT PO) Take 1 tablet by mouth Daily.      propranolol (INDERAL) 10 MG tablet Take 1 tablet by mouth 3 (Three) Times a Day. Update provider 90 tablet 2    amoxicillin (AMOXIL) 500 MG capsule       Zinc-Magnesium Aspart-Vit B6 (ZINC MAGNESIUM ASPARTATE PO) Take 1 tablet by mouth Daily.       No current facility-administered medications on file prior to visit.       Objective   Pulse 73   Resp 18   Ht 185.4 cm (73\")   Wt 118 kg (261 lb)   SpO2 97%   BMI 34.43 kg/m²     Foot/Ankle Exam  Physical Exam  GENERAL  Appearance:  appears stated age  Orientation:  AAOx3  Affect:  appropriate     VASCULAR      Left Foot Vascularity   Normal vascular exam    Dorsalis pedis:  2+  Posterior tibial:  2+  Skin temperature:  warm  Edema grading:  None  CFT:  < 3 seconds  Pedal hair growth:  Present  Varicosities:  none   "   NEUROLOGIC      Left Foot Neurologic   Light touch sensation: normal  Hot/Cold sensation:  normal  Achilles reflex:  2+     MUSCULOSKELETAL      Left Foot Musculoskeletal   Ecchymosis:  none  Tenderness:  none  Arch:  Normal     MUSCLE STRENGTH      Left Foot Muscle Strength   Normal strength    Foot dorsiflexion:  5  Foot plantar flexion:  5  Foot inversion:  5  Foot eversion:  5     DERMATOLOGIC          Left Foot Dermatologic   Skin  Left foot skin is intact.      Left foot additional comments: Minimal discomfort with palpation involving the posterior aspect of the calcaneus.  No obvious Achilles tendon deficit or hypertrophy.  Small Lorrie's deformity.    1/8/25: Less discomfort with palpation involving the posterior aspect of the calcaneus.  Achilles tendon remains intact without hypertrophy.  No signs of inflammation.  No progressive deformity or instability involving the foot or ankle      Results      Assessment & Plan   Diagnoses and all orders for this visit:    1. Acute left ankle pain (Primary)    2. Achilles tendinitis, left leg    3. Acquired posterior equinus, left    4. Lorrie's deformity, left      Assessment & Plan    The patient's condition is stable with no significant inflammation observed. He reports an average pain level of 2 to 3 out of 10, primarily at the back of the leg. He has stopped activities that aggravate the pain, such as box jumps and double unders. He was advised to continue with his current regimen of stretching exercises and activity modification as needed. The potential benefits of PRP injections were discussed, but he prefers to persist with his current treatment plan. If symptoms worsen, PRP injections may be considered. He was informed that if his condition improves significantly within the next 6 months, he can cancel the appointment. However, if he experiences any issues, he should reschedule the appointment sooner.Reviewed proper basic stretching and manual therapy  exercises along with appropriate shoes and activity.  Discussed proper use and/or avoidance of OTC anti-inflammatories.  Patient is to call with any additional issues or concerns.  Greater than 20 minutes was spent before, during, and after evaluation for patient care.    Follow-up  The patient will follow up in 6 months or sooner as needed.             Patient or patient representative verbalized consent for the use of Ambient Listening during the visit with  MELONIE Perea DPM for chart documentation. 1/9/2025  07:30 EST    MELONIE Perea DPM

## 2025-03-14 DIAGNOSIS — J45.990 EXERCISE-INDUCED BRONCHOSPASM: ICD-10-CM

## 2025-03-14 DIAGNOSIS — F41.9 ANXIETY: ICD-10-CM

## 2025-03-14 RX ORDER — ALBUTEROL SULFATE 90 UG/1
2 INHALANT RESPIRATORY (INHALATION) EVERY 4 HOURS PRN
Qty: 18 G | Refills: 2 | Status: SHIPPED | OUTPATIENT
Start: 2025-03-14

## 2025-03-14 RX ORDER — PROPRANOLOL HYDROCHLORIDE 10 MG/1
10 TABLET ORAL 3 TIMES DAILY
Qty: 270 TABLET | Refills: 0 | Status: SHIPPED | OUTPATIENT
Start: 2025-03-14

## 2025-08-29 DIAGNOSIS — J45.990 EXERCISE-INDUCED BRONCHOSPASM: ICD-10-CM

## 2025-08-29 DIAGNOSIS — F41.9 ANXIETY: ICD-10-CM

## 2025-08-29 RX ORDER — PROPRANOLOL HYDROCHLORIDE 10 MG/1
10 TABLET ORAL 3 TIMES DAILY
Qty: 270 TABLET | Refills: 0 | Status: SHIPPED | OUTPATIENT
Start: 2025-08-29

## 2025-08-29 RX ORDER — ALBUTEROL SULFATE 90 UG/1
2 INHALANT RESPIRATORY (INHALATION) EVERY 4 HOURS PRN
Qty: 18 G | Refills: 2 | Status: SHIPPED | OUTPATIENT
Start: 2025-08-29